# Patient Record
Sex: MALE | Employment: UNEMPLOYED | URBAN - METROPOLITAN AREA
[De-identification: names, ages, dates, MRNs, and addresses within clinical notes are randomized per-mention and may not be internally consistent; named-entity substitution may affect disease eponyms.]

---

## 2017-03-06 ENCOUNTER — HOSPITAL ENCOUNTER (INPATIENT)
Age: 72
LOS: 4 days | Discharge: NURSING, REHAB OR LONG-TERM CARE | DRG: 065 | End: 2017-03-10
Attending: EMERGENCY MEDICINE | Admitting: INTERNAL MEDICINE
Payer: COMMERCIAL

## 2017-03-06 ENCOUNTER — APPOINTMENT (OUTPATIENT)
Dept: CT IMAGING | Age: 72
DRG: 065 | End: 2017-03-06
Attending: EMERGENCY MEDICINE
Payer: COMMERCIAL

## 2017-03-06 ENCOUNTER — APPOINTMENT (OUTPATIENT)
Dept: GENERAL RADIOLOGY | Age: 72
DRG: 065 | End: 2017-03-06
Attending: EMERGENCY MEDICINE
Payer: COMMERCIAL

## 2017-03-06 DIAGNOSIS — G45.9 TRANSIENT CEREBRAL ISCHEMIA, UNSPECIFIED TYPE: Primary | ICD-10-CM

## 2017-03-06 PROBLEM — I63.9 ACUTE CVA (CEREBROVASCULAR ACCIDENT) (HCC): Status: ACTIVE | Noted: 2017-03-06

## 2017-03-06 LAB
ALBUMIN SERPL BCP-MCNC: 3.6 G/DL (ref 3.4–5)
ALBUMIN/GLOB SERPL: 0.8 {RATIO} (ref 0.8–1.7)
ALP SERPL-CCNC: 97 U/L (ref 45–117)
ALT SERPL-CCNC: 13 U/L (ref 16–61)
ANION GAP BLD CALC-SCNC: 7 MMOL/L (ref 3–18)
AST SERPL W P-5'-P-CCNC: 11 U/L (ref 15–37)
BASOPHILS # BLD AUTO: 0.1 K/UL (ref 0–0.06)
BASOPHILS # BLD: 1 % (ref 0–2)
BILIRUB DIRECT SERPL-MCNC: 0.1 MG/DL (ref 0–0.2)
BILIRUB SERPL-MCNC: 0.6 MG/DL (ref 0.2–1)
BUN SERPL-MCNC: 13 MG/DL (ref 7–18)
BUN/CREAT SERPL: 10 (ref 12–20)
CALCIUM SERPL-MCNC: 8.9 MG/DL (ref 8.5–10.1)
CHLORIDE SERPL-SCNC: 102 MMOL/L (ref 100–108)
CK MB CFR SERPL CALC: 1.8 % (ref 0–4)
CK MB SERPL-MCNC: 1.2 NG/ML (ref 5–25)
CK SERPL-CCNC: 65 U/L (ref 39–308)
CO2 SERPL-SCNC: 28 MMOL/L (ref 21–32)
CREAT SERPL-MCNC: 1.27 MG/DL (ref 0.6–1.3)
DIFFERENTIAL METHOD BLD: ABNORMAL
EOSINOPHIL # BLD: 0.3 K/UL (ref 0–0.4)
EOSINOPHIL NFR BLD: 2 % (ref 0–5)
ERYTHROCYTE [DISTWIDTH] IN BLOOD BY AUTOMATED COUNT: 13.5 % (ref 11.6–14.5)
GLOBULIN SER CALC-MCNC: 4.8 G/DL (ref 2–4)
GLUCOSE SERPL-MCNC: 204 MG/DL (ref 74–99)
HCT VFR BLD AUTO: 49.8 % (ref 36–48)
HGB BLD-MCNC: 17.7 G/DL (ref 13–16)
INR PPP: 1.1 (ref 0.8–1.2)
LYMPHOCYTES # BLD AUTO: 18 % (ref 21–52)
LYMPHOCYTES # BLD: 2.9 K/UL (ref 0.9–3.6)
MCH RBC QN AUTO: 30.3 PG (ref 24–34)
MCHC RBC AUTO-ENTMCNC: 35.5 G/DL (ref 31–37)
MCV RBC AUTO: 85.3 FL (ref 74–97)
MONOCYTES # BLD: 1 K/UL (ref 0.05–1.2)
MONOCYTES NFR BLD AUTO: 6 % (ref 3–10)
NEUTS SEG # BLD: 12.3 K/UL (ref 1.8–8)
NEUTS SEG NFR BLD AUTO: 73 % (ref 40–73)
PLATELET # BLD AUTO: 257 K/UL (ref 135–420)
PMV BLD AUTO: 9.6 FL (ref 9.2–11.8)
POTASSIUM SERPL-SCNC: 3.8 MMOL/L (ref 3.5–5.5)
PROT SERPL-MCNC: 8.4 G/DL (ref 6.4–8.2)
PROTHROMBIN TIME: 13.5 SEC (ref 11.5–15.2)
RBC # BLD AUTO: 5.84 M/UL (ref 4.7–5.5)
SODIUM SERPL-SCNC: 137 MMOL/L (ref 136–145)
TROPONIN I SERPL-MCNC: <0.02 NG/ML (ref 0–0.04)
WBC # BLD AUTO: 16.6 K/UL (ref 4.6–13.2)

## 2017-03-06 PROCEDURE — 85025 COMPLETE CBC W/AUTO DIFF WBC: CPT | Performed by: EMERGENCY MEDICINE

## 2017-03-06 PROCEDURE — 85610 PROTHROMBIN TIME: CPT | Performed by: EMERGENCY MEDICINE

## 2017-03-06 PROCEDURE — 70450 CT HEAD/BRAIN W/O DYE: CPT

## 2017-03-06 PROCEDURE — 93005 ELECTROCARDIOGRAM TRACING: CPT

## 2017-03-06 PROCEDURE — 80048 BASIC METABOLIC PNL TOTAL CA: CPT | Performed by: EMERGENCY MEDICINE

## 2017-03-06 PROCEDURE — 80076 HEPATIC FUNCTION PANEL: CPT | Performed by: EMERGENCY MEDICINE

## 2017-03-06 PROCEDURE — 65660000000 HC RM CCU STEPDOWN

## 2017-03-06 PROCEDURE — 74011250637 HC RX REV CODE- 250/637: Performed by: EMERGENCY MEDICINE

## 2017-03-06 PROCEDURE — 71010 XR CHEST PORT: CPT

## 2017-03-06 PROCEDURE — 99285 EMERGENCY DEPT VISIT HI MDM: CPT

## 2017-03-06 PROCEDURE — 84484 ASSAY OF TROPONIN QUANT: CPT | Performed by: EMERGENCY MEDICINE

## 2017-03-06 RX ORDER — ASPIRIN 325 MG
325 TABLET ORAL
Status: COMPLETED | OUTPATIENT
Start: 2017-03-06 | End: 2017-03-06

## 2017-03-06 RX ADMIN — ASPIRIN 325 MG ORAL TABLET 325 MG: 325 PILL ORAL at 21:10

## 2017-03-06 NOTE — IP AVS SNAPSHOT
Jose David Barragann 
 
 
 [address] Patient: Ramiro Lundberg MRN: [id number] You are allergic to the following No active allergies Recent Documentation Height Weight BMI  
  
  
 1.753 m 88.3 kg 29.59 kg/m2 Emergency Contacts Name Discharge Info Relation Home Work Mobile Fiorella Wilkins DISCHARGE CAREGIVER [3] Spouse [3] [phone] About your hospitalization You were admitted on:  March 6, 2017 You last received care in the:  [facility] [address] You were discharged on:  March 10, 2017 Unit phone number:  [phone] Why you were hospitalized Your primary diagnosis was:  Not on File Your diagnoses also included:  Tia (Transient Ischemic Attack), Acute Cva (Cerebrovascular Accident) (Hcc), Cva (Cerebral Vascular Accident) (Hcc), Hyperglycemia Providers Seen During Your Hospitalizations Provider Role Specialty Primary office phone Zhane Chavez MD Attending Provider Emergency Medicine [phone] Hamida Bernardo MD Attending Provider Internal Medicine Number not on file Your Primary Care Physician (PCP) Primary Care Physician Office Phone Office Fax Roberto Mcduffie [phone] Follow-up Information Follow up With Details Comments Contact Info Thor Last MD   [address] [address] 
[phone] Josie Markham MD Schedule an appointment as soon as possible for a visit in 1 month  1 [facility] [address] [place] [address] 
[phone] Patient is transferred to [facility] for further [email or web address] Current Discharge Medication List  
  
START taking these medications Dose & Instructions Dispensing Information Comments Morning Noon Evening Bedtime aspirin 81 mg chewable tablet Your next dose is: Today, Tomorrow Other:  _________ Dose:  81 mg Take 1 Tab by mouth daily. Quantity:  30 Tab Refills:  0  
     
   
   
   
  
 atorvastatin 80 mg tablet Commonly known as:  LIPITOR Your next dose is: Today, Tomorrow Other:  _________ Dose:  80 mg Take 1 Tab by mouth nightly. Quantity:  30 Tab Refills:  0  
     
   
   
   
  
 famotidine 20 mg tablet Commonly known as:  PEPCID Your next dose is: Today, Tomorrow Other:  _________ Dose:  20 mg Take 1 Tab by mouth nightly. Quantity:  30 Tab Refills:  0 HYDROcodone-acetaminophen 5-325 mg per tablet Commonly known as:  Nick Songster Your next dose is: Today, Tomorrow Other:  _________ Dose:  1 Tab Take 1 Tab by mouth every six (6) hours as needed. Max Daily Amount: 4 Tabs. Quantity:  15 Tab Refills:  0  
     
   
   
   
  
 insulin glargine 100 unit/mL injection Commonly known as:  LANTUS Your next dose is: Today, Tomorrow Other:  _________ Dose:  12 Units 12 Units by SubCUTAneous route daily. Quantity:  1 Vial  
Refills:  0  
     
   
   
   
  
 insulin lispro 100 unit/mL injection Commonly known as:  HUMALOG Your next dose is: Today, Tomorrow Other:  _________ SubCUTAneous route Before breakfast, lunch, dinner and at bedtime. For Blood Sugar (mg/dL) of:    Less than 150 =   0 units          150 -199 =   2 units 200 -249 =   4 units 250 -299 =   6 units 300 -349 =   8 units 350 and above =  10 units Quantity:  1 Vial  
Refills:  0  
     
   
   
   
  
 metoprolol tartrate 25 mg tablet Commonly known as:  LOPRESSOR Your next dose is: Today, Tomorrow Other:  _________ Dose:  25 mg Take 1 Tab by mouth two (2) times a day. Quantity:  60 Tab Refills:  0 Where to Get Your Medications Information on where to get these meds will be given to you by the nurse or doctor. ! Ask your nurse or doctor about these medications  
  aspirin 81 mg chewable tablet  
 atorvastatin 80 mg tablet  
 famotidine 20 mg tablet HYDROcodone-acetaminophen 5-325 mg per tablet  
 insulin glargine 100 unit/mL injection  
 insulin lispro 100 unit/mL injection  
 metoprolol tartrate 25 mg tablet Discharge Instructions Patient armband removed and shredded MyChart Activation Thank you for requesting access to [employer]. Please follow the instructions below to securely access and download your online medical record. [employer] allows you to send messages to your doctor, view your test results, renew your prescriptions, schedule appointments, and more. How Do I Sign Up? 1. In your internet browser, go to [email or web address] 
2. Click on the First Time User? Click Here link in the Sign In box. You will be redirect to the New Member Sign Up page. 3. Enter your [employer] Access Code exactly as it appears below. You will not need to use this code after youve completed the sign-up process. If you do not sign up before the expiration date, you must request a new code. [employer] Access Code: [email or web address] Expires: 2017  8:58 PM (This is the date your [employer] access code will ) 4. Enter the last four digits of your Social Security Number (xxxx) and Date of Birth (mm/dd/yyyy) as indicated and click Submit. You will be taken to the next sign-up page. 5. Create a [employer] ID. This will be your [employer] login ID and cannot be changed, so think of one that is secure and easy to remember. 6. Create a [employer] password. You can change your password at any time. 7. Enter your Password Reset Question and Answer. This can be used at a later time if you forget your password. 8. Enter your e-mail address.  You will receive e-mail notification when new information is available in Mitrohart. 9. Click Sign Up. You can now view and download portions of your medical record. 10. Click the Download Summary menu link to download a portable copy of your medical information. Additional Information If you have questions, please visit the Frequently Asked Questions section of the [employer] website at [email or web address] Remember, [employer] is NOT to be used for urgent needs. For medical emergencies, dial 911. DISCHARGE SUMMARY from Nurse The following personal items are in your possession at time of discharge: 
 
Dental Appliances: None Visual Aid: None Home Medications: None Jewelry: None Clothing: At bedside Other Valuables: None PATIENT INSTRUCTIONS: 
 
After general anesthesia or intravenous sedation, for 24 hours or while taking prescription Narcotics: · Limit your activities · Do not drive and operate hazardous machinery · Do not make important personal or business decisions · Do  not drink alcoholic beverages · If you have not urinated within 8 hours after discharge, please contact your surgeon on call. Report the following to your surgeon: 
· Excessive pain, swelling, redness or odor of or around the surgical area · Temperature over 100.5 · Nausea and vomiting lasting longer than 4 hours or if unable to take medications · Any signs of decreased circulation or nerve impairment to extremity: change in color, persistent  numbness, tingling, coldness or increase pain · Any questions What to do at Home: 
Recommended activity: Activity as tolerated, If you experience any of the following symptoms shortness of breath, chest pain, signs of stroke see fast, please follow up with ED or Primary Md. *  Please give a list of your current medications to your Primary Care Provider.  
 
*  Please update this list whenever your medications are discontinued, doses are 
 changed, or new medications (including over-the-counter products) are added. *  Please carry medication information at all times in case of emergency situations. These are general instructions for a healthy lifestyle: No smoking/ No tobacco products/ Avoid exposure to second hand smoke Surgeon General's Warning:  Quitting smoking now greatly reduces serious risk to your health. Obesity, smoking, and sedentary lifestyle greatly increases your risk for illness A healthy diet, regular physical exercise & weight monitoring are important for maintaining a healthy lifestyle You may be retaining fluid if you have a history of heart failure or if you experience any of the following symptoms:  Weight gain of 3 pounds or more overnight or 5 pounds in a week, increased swelling in our hands or feet or shortness of breath while lying flat in bed. Please call your doctor as soon as you notice any of these symptoms; do not wait until your next office visit. Recognize signs and symptoms of STROKE: 
 
F-face looks uneven A-arms unable to move or move unevenly S-speech slurred or non-existent T-time-call 911 as soon as signs and symptoms begin-DO NOT go Back to bed or wait to see if you get better-TIME IS BRAIN. Warning Signs of HEART ATTACK Call 911 if you have these symptoms: 
? Chest discomfort. Most heart attacks involve discomfort in the center of the chest that lasts more than a few minutes, or that goes away and comes back. It can feel like uncomfortable pressure, squeezing, fullness, or pain. ? Discomfort in other areas of the upper body. Symptoms can include pain or discomfort in one or both arms, the back, neck, jaw, or stomach. ? Shortness of breath with or without chest discomfort. ? Other signs may include breaking out in a cold sweat, nausea, or lightheadedness. Don't wait more than five minutes to call [address]!  Fast action can save your life. Calling 911 is almost always the fastest way to get lifesaving treatment. Emergency Medical Services staff can begin treatment when they arrive  up to an hour sooner than if someone gets to the hospital by car. The discharge information has been reviewed with the guardian. The guardian verbalized understanding. Discharge medications reviewed with the guardian and appropriate educational materials and side effects teaching were provided. Discharge Instructions Attachments/References STROKE (ENGLISH) STROKE REHABILITATION (ENGLISH) STROKE: EMOTIONS: GENERAL INFO (ENGLISH) STROKE: PRIMARY PREVENTION: GENERAL INFO (ENGLISH) STROKE: SECONDARY PREVENTION: GENERAL INFO (ENGLISH) STROKE: SYMPTOMS: GENERAL INFO (ENGLISH) TIA (TRANSIENT ISCHEMIC ATTACK) (ENGLISH) Discharge Orders None [facility] Announcement We are excited to announce that we are making your provider's discharge notes available to you in Digitel. You will see these notes when they are completed and signed by the physician that discharged you from your recent hospital stay. If you have any questions or concerns about any information you see in Digitel, please call the Health Information Department where you were seen or reach out to your Primary Care Provider for more information about your plan of care. Introducing [facility]! Hailey Brody introduces Digitel patient portal. Now you can access parts of your medical record, email your doctor's office, and request medication refills online. 1. In your internet browser, go to [email or web address] 2. Click on the First Time User? Click Here link in the Sign In box. You will see the New Member Sign Up page. 3. Enter your Digitel Access Code exactly as it appears below. You will not need to use this code after youve completed the sign-up process.  If you do not sign up before the expiration date, you must request a new code. · SingleFeed Access Code: [email or web address] Expires: 6/4/2017  8:58 PM 
 
4. Enter the last four digits of your Social Security Number (xxxx) and Date of Birth (mm/dd/yyyy) as indicated and click Submit. You will be taken to the next sign-up page. 5. Create a SingleFeed ID. This will be your SingleFeed login ID and cannot be changed, so think of one that is secure and easy to remember. 6. Create a SingleFeed password. You can change your password at any time. 7. Enter your Password Reset Question and Answer. This can be used at a later time if you forget your password. 8. Enter your e-mail address. You will receive e-mail notification when new information is available in [address] 9. Click Sign Up. You can now view and download portions of your medical record. 10. Click the Download Summary menu link to download a portable copy of your medical information. If you have questions, please visit the Frequently Asked Questions section of the SingleFeed website. Remember, SingleFeed is NOT to be used for urgent needs. For medical emergencies, dial 911. Now available from your iPhone and Android! General Information Please provide this summary of care documentation to your next provider. Patient Signature:  ____________________________________________________________ Date:  ____________________________________________________________  
  
Earnstine Ted Provider Signature:  ____________________________________________________________ Date:  ____________________________________________________________ More Information Stroke: Care Instructions Your Care Instructions You have had a stroke. This means that the blood flow to a part of your brain was blocked for some time, which damages the nerve cells in that part of the brain.  The part of your body controlled by that part of your brain may not function properly now. The brain is an amazing organ that can heal itself to some degree. The stroke you had damaged part of your brain. But other parts of your brain may take over in some way for the damaged areas. You have already started this process. Your doctor will talk with you about what you can do to prevent another stroke. High blood pressure, high cholesterol, and diabetes are all risk factors for stroke. If you have any of these conditions, work with your doctor to make sure they are under control. Other risk factors for stroke include being overweight, smoking, and not getting regular exercise. Going home may be hard for you and your family. The more you can try to do for yourself, the better. Remember to take each day one at a time. Follow-up care is a key part of your treatment and safety. Be sure to make and go to all appointments, and call your doctor if you are having problems. It's also a good idea to know your test results and keep a list of the medicines you take. How can you care for yourself at home? · Enter a stroke rehabilitation (rehab) program, if your doctor recommends it. Physical, speech, and occupational therapies can help you manage bathing, dressing, eating, and other basics of daily living. · Do not drive until your doctor says it is okay. · It is normal to feel sad or depressed after a stroke. If these feelings last, talk to your doctor. · If you are having problems with urine leakage, go to the bathroom at regular times, including when you first wake up and at bedtime. Also, limit fluids after dinner. · If you are constipated, drink plenty of fluids, enough so that your urine is light yellow or clear like water. If you have kidney, heart, or liver disease and have to limit fluids, talk with your doctor before you increase the amount of fluids you drink. Set up a regular time for using the toilet.  If you continue to have constipation, your doctor may suggest using a bulking agent, such as Metamucil, or a stool softener, laxative, or enema. Medicines · Take your medicines exactly as prescribed. Call your doctor if you think you are having a problem with your medicine. You may be taking several medicines. ACE (angiotensin-converting enzyme) inhibitors, angiotensin II receptor blockers (ARBs), beta-blockers, diuretics (water pills), and calcium channel blockers control your blood pressure. Statins help lower cholesterol. Your doctor may also prescribe medicines for depression, pain, sleep problems, anxiety, or agitation. · If your doctor has given you a blood thinner to prevent another stroke, be sure you get instructions about how to take your medicine safely. Blood thinners can cause serious bleeding problems. · Do not take any over-the-counter medicines or herbal products without talking to your doctor first. 
· If you take birth control pills or hormone therapy, talk to your doctor about whether they are right for you. For family members and caregivers · Make the home safe. Set up a room so that your loved one does not have to climb stairs. Be sure the bathroom is on the same floor. Move throw rugs and furniture that could cause falls. Make sure that the lighting is good. Put grab bars and seats in tubs and showers. · Find out what your loved one can do and what he or she needs help with. Try not to do things for your loved one that your loved one can do on his or her own. Help him or her learn and practice new skills. · Visit and talk with your loved one often. Try doing activities together that you both enjoy, such as playing cards or board games. Keep in touch with your loved one's friends as much as you can. Encourage them to visit. · Take care of yourself. Do not try to do everything yourself. Ask other family members to help. Eat well, get enough rest, and take time to do things that you enjoy.  Keep up with your own doctor visits, and make sure to take your medicines regularly. Get out of the house as much as you can. Join a local support group. Find out if you qualify for home health care visits to help with rehab or for adult day care. When should you call for help? Call 911 anytime you think you may need emergency care. For example, call if: 
· You have signs of another stroke. These may include: 
¨ Sudden numbness, tingling, weakness, or loss of movement in your face, arm, or leg, especially on only one side of your body. ¨ Sudden vision changes. ¨ Sudden trouble speaking. ¨ Sudden confusion or trouble understanding simple statements. ¨ Sudden problems with walking or balance. ¨ A sudden, severe headache that is different from past headaches. Call 911 even if these symptoms go away in a few minutes. Call your doctor now or seek immediate medical care if: 
· You have new symptoms that may be related to your stroke, such as falls or trouble swallowing. Watch closely for changes in your health, and be sure to contact your doctor if you have any problems. Where can you learn more? Go to http://jorge-david.info/. Enter V980 in the search box to learn more about \"Stroke: Care Instructions. \" Current as of: June 4, 2016 Content Version: 11.1 © 4784-1678 Healthwise, Incorporated. Care instructions adapted under license by [employer] (which disclaims liability or warranty for this information). If you have questions about a medical condition or this instruction, always ask your healthcare professional. [place] [address] any warranty or liability for your use of this information. Stroke Rehabilitation: Care Instructions Your Care Instructions Stroke rehabilitation (rehab) is training and therapy to help you learn to do everyday things you can no longer do since your stroke. The focus will depend on how the stroke has affected your ability to do the things you want and need to do. Rehab begins in the hospital. It starts as soon as possible after the stroke. You will have a team of doctors, nurses, and therapists to help you relearn daily activities, such as eating, bathing, and dressing. You also may need help to learn how to walk or talk again. If the stroke damaged your memory, you will learn ways to improve it. You will recover faster if you begin rehab soon after the stroke and do a little every day. Most rehab programs offer at least 3 hours of therapy a day, 5 or 6 days a week. But even with rehab, you may not be able to do all the things you could before the stroke. You will need medicines to prevent another stroke. Your doctor may prescribe other medicines to lower high blood pressure and cholesterol. You may also be given medicines to help manage pain, sleep problems, or depression. Follow-up care is a key part of your treatment and safety. Be sure to make and go to all appointments, and call your doctor if you are having problems. It's also a good idea to know your test results and keep a list of the medicines you take. How can you care for yourself at home? · Follow any instructions from your doctor, therapist, or rehab care team about how to return safely to everyday tasks. · If you have problems swallowing after a stroke, follow your care team's instructions on how to swallow and prevent choking. This may include what kinds of foods to eat or avoid. Your instructions may include tips such as these: 
¨ Eat foods that smell good. This can increase the amount of saliva and help you swallow. ¨ Take small bites of food. Place food on the unaffected side of your mouth. ¨ Allow about 30 to 40 minutes to eat, so you will not feel rushed. Also, sit up for 45 to 60 minutes after you finish eating. ¨ Fill your glass three-quarters full so that you do not have to tilt your head back very far.  
· Have someone clean up any clutter in your house so you do not trip over it. Have area rugs removed. Make sure your rooms are well lit. · Put Velcro, elastic, and snaps on clothes to make them easier to get on and off. It may also help to sit down while you get dressed. · Put safety features, such as rails and nonskid tape, in the bathroom and other rooms. · Use a cane or walker to help you get around. · Do not sit or lie down in the same position for a long time. Check your skin every day for pressure sores. · Wear disposable pads if you have problems with bladder control. When should you call for help? Call 911 anytime you think you may need emergency care. For example, call if: 
· You have symptoms of a stroke. These may include: 
¨ Sudden numbness, tingling, weakness, or loss of movement in your face, arm, or leg, especially on only one side of your body. ¨ Sudden vision changes. ¨ Sudden trouble speaking. ¨ Sudden confusion or trouble understanding simple statements. ¨ Sudden problems with walking or balance. ¨ A sudden, severe headache that is different from past headaches. Call your doctor now or seek immediate medical care if: 
· You have problems that rehab is not helping. · You are depressed about your rehab progress. Watch closely for changes in your health, and be sure to contact your doctor if: 
· You do not get better as expected. Where can you learn more? Go to http://jorge-david.info/. Enter V173 in the search box to learn more about \"Stroke Rehabilitation: Care Instructions. \" Current as of: June 4, 2016 Content Version: 11.1 © 4683-0218 Healthwise, Incorporated. Care instructions adapted under license by "[employer] (which disclaims liability or warranty for this information). If you have questions about a medical condition or this instruction, always ask your healthcare professional. [address] any warranty or liability for your use of this information. Learning About Emotional Changes After a Stroke Introduction After a stroke, many people feel different without knowing why. For example, some people find it hard to control their emotions. They may cry or laugh for no reason. Or they may feel down or even hopeless. Some people may find they're acting differently. They may act too quickly or on impulse. Or they may be more anxious and hesitant at times. If these changes happen to you, they can be upsetting. And they can be confusing to you and your loved ones. But these changes may get better with time as your brain heals. Let your loved ones know what's happening. Their support and understanding can help you deal with these feelings. And with time and support from the people around you, you can learn ways to adjust to life after a stroke. Follow-up care is a key part of your treatment and safety. Be sure to make and go to all appointments, and call your doctor if you are having problems. It's also a good idea to know your test results and keep a list of the medicines you take. How can a stroke affect your emotions? After a stroke, some people feel like they have lost control of their emotions. These feelings can come from one or both of these two causes: · A stroke can affect parts of the brain that control how you feel. · A stroke can leave you with upsetting body changes that take away some of your independence. For example, some people may feel: · Sad or angry about the loss of the lifestyle they had before. · Isolated by speech and language problems. · Frustrated by the slow pace of recovery. · Worried about the future. These feelings are normal and expected. These strong feelings are more likely to happen if you need to stay in bed for long periods of time. And it is more likely to be a problem at night. It may help to have a radio playing softly in the bedroom or a dim light beside the bed. How can you deal with your emotions after a stroke? To deal with your emotions: 
· Be easy on yourself. Let go of mistakes. · Give yourself credit for the progress you have made. · Make time for things that you enjoy. · Join a stroke support group. Your rehab team or local hospital can help you find one. Is depression common? It is common to feel sad about changes caused by the stroke. Sometimes the injury to the brain from the stroke can cause depression. If you think you might be depressed, tell your doctor right away. The sooner you know if you are depressed, the sooner you can get treatment. Treatment can help you feel better. Your doctor will want to know if in the past 2 weeks: 
· You have lost interest or pleasure in doing things. · You have been feeling sad, hopeless, or empty. Your doctor may also ask about sleep troubles or changes in eating. How can friends and family help? Your loved ones can help you by following these tips: · A person who has had a stroke may tend to have strong emotional reactions. Remember that these are a result of the stroke. Try not to become too upset by them. · Don't avoid a loved one who's had a stroke. Contact with and support from family members is very important to recovery. · Watch for signs of depression in people who have had a stroke. Urge them to talk to their doctor if they think they might be depressed. Where can you learn more? Go to http://jorge-david.info/. Enter 921 9914 in the search box to learn more about \"Learning About Emotional Changes After a Stroke. \" Current as of: July 20, 2016 Content Version: 11.1 © 9888-6518 [employer]. Care instructions adapted under license by [employer] (which disclaims liability or warranty for this information).  If you have questions about a medical condition or this instruction, always ask your healthcare professional. [employer] disclaims any warranty or liability for your use of this information. Learning About How to Prevent a Stroke What is a stroke? A stroke occurs when a blood vessel in the brain bursts or is blocked by a blood clot. Without blood and the oxygen it carries, part of the brain starts to die. The part of the body controlled by the damaged area of the brain can't work properly. But there are many things you can do to help lower your stroke risk. What increases your risk for stroke? A risk factor is anything that makes you more likely to have a particular health problem. Risk factors for stroke that you can treat or change include: 
· Health problems like high blood pressure, atrial fibrillation, diabetes, and high cholesterol. · Smoking. · Heavy use of alcohol. · Being overweight. · Not getting enough physical activity. Risk factors you can't change include: · Age. The risk of stroke goes up as you get older. · Race.  Americans, Native Americans, and [place] Natives have a higher risk than those of other races. · Being female. Women have a higher risk of stroke than men. · Family history of stroke. Your doctor can help you know your risk. Then you and your can doctor talk about whether you need to lower it. What can you do to prevent a stroke? · Treat any health problems you have that raise your risk. · Adopt a heart-healthy lifestyle: ¨ Don't smoke. If you need help quitting, talk to your doctor about stop-smoking programs and medicines. These can increase your chances of quitting for good. ¨ Limit alcohol to 2 drinks a day for men and 1 drink a day for women. ¨ Stay at a healthy weight. Lose weight if you need to. ¨ If your doctor recommends it, get more exercise. Walking is a good choice. Bit by bit, increase the amount you walk every day. Try for at least 30 minutes on most days of the week. ¨ Eat heart-healthy foods.  These include fruits, vegetables, high-fiber foods, and fish. Choose foods that are low in sodium, saturated fat, and trans fat. · Decide with your doctor whether you will also take medicines to help lower your risk. For example, you and your doctor may decide you will take a medicine that prevents blood clots. What are the symptoms of a stroke? The brain damage from a stroke starts within minutes. That's why it's so important to know the symptoms of stroke and to act fast. Quick treatment can help limit damage to the brain so that you have fewer problems after the stroke. FAST is a simple way to remember the main symptoms of stroke. Recognizing these symptoms helps you know when to call for medical help. FAST stands for: 
· Face drooping. · Arm weakness. · Speech difficulty. · Time to call 911. Follow-up care is a key part of your treatment and safety. Be sure to make and go to all appointments, and call your doctor if you are having problems. It's also a good idea to know your test results and keep a list of the medicines you take. Where can you learn more? Go to http://jorge-david.info/. Enter G757 in the search box to learn more about \"Learning About How to Prevent a Stroke. \" Current as of: July 27, 2016 Content Version: 11.1 © 3553-8009 [employer]. Care instructions adapted under license by [employer] (which disclaims liability or warranty for this information). If you have questions about a medical condition or this instruction, always ask your healthcare professional. [place] [address] any warranty or liability for your use of this information. Learning About How to Prevent Another Stroke What can you do to prevent another stroke? After a stroke, people feel lots of different emotions. Some people are worried that they could have another stroke. Or they may feel overwhelmed by how much there is to learn and do. Some people feel sad or depressed. No matter what emotions you are feeling, you can give yourself some control and peace of mind by making a plan to lower your risk of having another stroke. Take your medicines You'll need to take medicines to help prevent another stroke. Be sure to take your medicines exactly as prescribed. And don't stop taking them unless your doctor tells you to. If you stop taking your medicines, you can increase your risk of having another stroke. Some of the medicines your doctor may prescribe include: · Aspirin or some other blood thinner to prevent blood clots. · Statins to lower cholesterol. · Blood pressure medicines to lower blood pressure. Manage other health problems You can help lower your chance of having another stroke by managing certain other health problems. Problems that increase your risk of having another stroke include: · High blood pressure. · High cholesterol. · Atrial fibrillation. · Diabetes. If you have any of these health problems, you can manage them with healthy lifestyle changes along with medicine. Adopt a healthy lifestyle · Do not smoke or allow others to smoke around you. If you need help quitting, talk to your doctor about stop-smoking programs and medicines. These can increase your chances of quitting for good. Smoking makes a stroke more likely. · Limit alcohol to 2 drinks a day for men and 1 drink a day for women. · Lose weight if you need to. Controlling your weight will help you keep your heart and body healthy. · Be active. Ask your doctor what type and level of activity is safe for you. · Eat heart-healthy foods, like fruits, vegetables, and high-fiber foods. It's also important to: · Get a flu shot every year. · Ask for help if you think you are depressed. Do stroke rehab Taking part in a stroke rehabilitation (rehab) program will help you to regain skills you lost or make the most of your abilities after a stroke. It also helps you take steps to prevent another stroke. Your rehab team will give you education and support to help you build new, healthy habits. You'll learn how to manage any other health problems that you might have. Brenda Antunez also learn how to exercise safely, eat a healthy diet, and quit smoking if you smoke. Brenda Johnsjesenia work with your team to decide what lifestyle choices are best for you. If your doctor hasn't already suggested it, ask him or her if stroke rehab is right for you. Know stroke symptoms Make sure you know the symptoms of stroke. FAST is a simple way to remember. Recognizing these symptoms helps you know when to call for medical help. FAST stands for: 
· Face drooping. · Arm weakness. · Speech difficulty. · Time to call 911. Follow-up care is a key part of your treatment and safety. Be sure to make and go to all appointments, and call your doctor if you are having problems. It's also a good idea to know your test results and keep a list of the medicines you take. Where can you learn more? Go to http://jorge-david.info/. Enter M409 in the search box to learn more about \"Learning About How to Prevent Another Stroke. \" Current as of: July 27, 2016 Content Version: 11.1 © 3687-2414 [employer]. Care instructions adapted under license by [employer] (which disclaims liability or warranty for this information). If you have questions about a medical condition or this instruction, always ask your healthcare professional. [place] [address] any warranty or liability for your use of this information. Learning About FAST: Stroke Warning Signs What is FAST? FAST is a simple way to remember the main symptoms of stroke. Recognizing these symptoms helps you know when to call for medical help. FAST stands for: 
· Face drooping. · Arm weakness. · Speech difficulty. · Time to call 911. What happens when you have a stroke? A stroke occurs when a blood vessel to the brain bursts or is blocked by a blood clot. Within minutes, the nerve cells in that part of the brain die. As a result, the part of the body controlled by those cells cannot work properly. The effects of a stroke may range from mild to severe. They may get better, or they may last the rest of your life. A stroke can affect vision, speech, behavior, thought processes, and your ability to move. It can cause symptoms that may include: 
· Sudden numbness, tingling, weakness, or loss of movement in your face, arm, or leg, especially on only one side of your body. · Sudden vision changes. · Sudden trouble speaking. · Sudden confusion or trouble understanding simple statements. · Sudden problems with walking or balance. · A sudden, severe headache that is different from past headaches. Why is it important to get help FAST? Quick treatment may save your life. And it may reduce the damage in your brain so that you have fewer problems after the stroke. When you know the FAST symptoms, you will know when it's important to call for medical help. Where can you learn more? Go to http://jorge-david.info/. Enter W865 in the search box to learn more about \"Learning About FAST: Stroke Warning Signs. \" Current as of: June 4, 2016 Content Version: 11.1 © 3201-8496 [employer] [facility]. Care instructions adapted under license by [employer] (which disclaims liability or warranty for this information). If you have questions about a medical condition or this instruction, always ask your healthcare professional. [place] [address] any warranty or liability for your use of this information. Transient Ischemic Attack: Care Instructions Your Care Instructions A transient ischemic attack (TIA) is when blood flow to a part of your brain is blocked for a short time.  A TIA is like a stroke but usually lasts only a few minutes. A TIA does not cause lasting brain damage. Any vision problems, slurred speech, or other symptoms usually go away in 10 to 20 minutes. But they may last for up to 24 hours. TIAs are often warning signs of a stroke. Some people who have a TIA may have a stroke in the future. A stroke can cause symptoms like those of a TIA. But a stroke causes lasting damage to your brain. You can take steps to help prevent a stroke. One thing you can do is get early treatment. If you have other new symptoms, or if your symptoms do not get better, go back to the emergency room or call your doctor right away. Getting treatment right away may prevent long-term brain damage caused by a stroke. The doctor has checked you carefully, but problems can develop later. If you notice any problems or new symptoms, get medical treatment right away. Follow-up care is a key part of your treatment and safety. Be sure to make and go to all appointments, and call your doctor if you are having problems. It's also a good idea to know your test results and keep a list of the medicines you take. How can you care for yourself at home? Medicines · Be safe with medicines. Take your medicines exactly as prescribed. Call your doctor if you think you are having a problem with your medicine. · If you take a blood thinner, such as aspirin, be sure you get instructions about how to take your medicine safely. Blood thinners can cause serious bleeding problems. · Call your doctor if you are not able to take your medicines for any reason. · Do not take any over-the-counter medicines or herbal products without talking to your doctor first. 
· If you take birth control pills or hormone therapy, talk to your doctor. Ask if these treatments are right for you. Lifestyle changes · Do not smoke. If you need help quitting, talk to your doctor about stop-smoking programs and medicines. · Be active. If your doctor recommends it, get more exercise. Walking is a good choice. Bit by bit, increase the amount you walk every day. Try for at least 30 minutes on most days of the week. You also may want to swim, bike, or do other activities. · Eat heart-healthy foods. These include fruits, vegetables, high-fiber foods, fish, and foods that are low in sodium, saturated fat, and trans fat. · Stay at a healthy weight. Lose weight if you need to. · Limit alcohol to 2 drinks a day for men and 1 drink a day for women. Staying healthy · Manage other health problems such as diabetes, high blood pressure, and high cholesterol. · Get the flu vaccine every year. When should you call for help? Call 911 anytime you think you may need emergency care. For example, call if: 
· You have new or worse symptoms of a stroke. These may include: 
¨ Sudden numbness, tingling, weakness, or loss of movement in your face, arm, or leg, especially on only one side of your body. ¨ Sudden vision changes. ¨ Sudden trouble speaking. ¨ Sudden confusion or trouble understanding simple statements. ¨ Sudden problems with walking or balance. ¨ A sudden, severe headache that is different from past headaches. Call 911 even if these symptoms go away in a few minutes. · You feel like you are having another TIA. Watch closely for changes in your health, and be sure to contact your doctor if you have any problems. Where can you learn more? Go to http://jorge-david.info/. Enter [phone] [id number] in the search box to learn more about \"Transient Ischemic Attack: Care Instructions. \" Current as of: June 4, 2016 Content Version: 11.1 © 7724-1141 [employer]. Care instructions adapted under license by [employer] (which disclaims liability or warranty for this information).  If you have questions about a medical condition or this instruction, always ask your healthcare professional. Horatio Habermann[employer] disclaims any warranty or liability for your use of this information.

## 2017-03-06 NOTE — IP AVS SNAPSHOT
Current Discharge Medication List  
  
Take these medications at their scheduled times Dose & Instructions Dispensing Information Comments Morning Noon Evening Bedtime  
 aspirin 81 mg chewable tablet Your next dose is: Today, Tomorrow Other:  ____________ Dose:  81 mg Take 1 Tab by mouth daily. Quantity:  30 Tab Refills:  0  
     
   
   
   
  
 atorvastatin 80 mg tablet Commonly known as:  LIPITOR Your next dose is: Today, Tomorrow Other:  ____________ Dose:  80 mg Take 1 Tab by mouth nightly. Quantity:  30 Tab Refills:  0  
     
   
   
   
  
 famotidine 20 mg tablet Commonly known as:  PEPCID Your next dose is: Today, Tomorrow Other:  ____________ Dose:  20 mg Take 1 Tab by mouth nightly. Quantity:  30 Tab Refills:  0  
     
   
   
   
  
 insulin glargine 100 unit/mL injection Commonly known as:  LANTUS Your next dose is: Today, Tomorrow Other:  ____________ Dose:  12 Units 12 Units by SubCUTAneous route daily. Quantity:  1 Vial  
Refills:  0  
     
   
   
   
  
 metoprolol tartrate 25 mg tablet Commonly known as:  LOPRESSOR Your next dose is: Today, Tomorrow Other:  ____________ Dose:  25 mg Take 1 Tab by mouth two (2) times a day. Quantity:  60 Tab Refills:  0 Take these medications as needed Dose & Instructions Dispensing Information Comments Morning Noon Evening Bedtime HYDROcodone-acetaminophen 5-325 mg per tablet Commonly known as:  Tori Arts Your next dose is: Today, Tomorrow Other:  ____________ Dose:  1 Tab Take 1 Tab by mouth every six (6) hours as needed. Max Daily Amount: 4 Tabs. Quantity:  15 Tab Refills:  0 Take these medications as directed Dose & Instructions Dispensing Information Comments Morning Noon Evening Bedtime  
 insulin lispro 100 unit/mL injection Commonly known as:  HUMALOG Your next dose is: Today, Tomorrow Other:  ____________ SubCUTAneous route Before breakfast, lunch, dinner and at bedtime. For Blood Sugar (mg/dL) of:    Less than 150 =   0 units          150 -199 =   2 units 200 -249 =   4 units 250 -299 =   6 units 300 -349 =   8 units 350 and above =  10 units Quantity:  1 Vial  
Refills:  0 Where to Get Your Medications Information about where to get these medications is not yet available ! Ask your nurse or doctor about these medications  
  aspirin 81 mg chewable tablet  
 atorvastatin 80 mg tablet  
 famotidine 20 mg tablet HYDROcodone-acetaminophen 5-325 mg per tablet  
 insulin glargine 100 unit/mL injection  
 insulin lispro 100 unit/mL injection  
 metoprolol tartrate 25 mg tablet

## 2017-03-06 NOTE — Clinical Note
Status[de-identified] Inpatient [101] Type of Bed: Neuro/Stroke [9] Inpatient Hospitalization Certified Necessary for the Following Reasons: 8. Other (further clarification in H&P documentation) Admitting Diagnosis: TIA (transient ischemic attack) [097100] Admitting Physician: Chester Lee Attending Physician: Chester Lee Estimated Length of Stay: > or = to 2 Midnights Discharge Plan[de-identified] 2003 [facility]

## 2017-03-07 ENCOUNTER — APPOINTMENT (OUTPATIENT)
Dept: MRI IMAGING | Age: 72
DRG: 065 | End: 2017-03-07
Attending: INTERNAL MEDICINE
Payer: COMMERCIAL

## 2017-03-07 ENCOUNTER — APPOINTMENT (OUTPATIENT)
Dept: GENERAL RADIOLOGY | Age: 72
DRG: 065 | End: 2017-03-07
Attending: FAMILY MEDICINE
Payer: COMMERCIAL

## 2017-03-07 PROBLEM — R73.9 HYPERGLYCEMIA: Status: ACTIVE | Noted: 2017-03-07

## 2017-03-07 PROBLEM — I63.9 CVA (CEREBRAL VASCULAR ACCIDENT) (HCC): Status: ACTIVE | Noted: 2017-03-07

## 2017-03-07 LAB
ATRIAL RATE: 95 BPM
CALCULATED P AXIS, ECG09: -11 DEGREES
CALCULATED R AXIS, ECG10: 34 DEGREES
CALCULATED T AXIS, ECG11: 63 DEGREES
DIAGNOSIS, 93000: NORMAL
EST. AVERAGE GLUCOSE BLD GHB EST-MCNC: 226 MG/DL
GLUCOSE BLD STRIP.AUTO-MCNC: 182 MG/DL (ref 70–110)
GLUCOSE BLD STRIP.AUTO-MCNC: 220 MG/DL (ref 70–110)
GLUCOSE BLD STRIP.AUTO-MCNC: 236 MG/DL (ref 70–110)
GLUCOSE BLD STRIP.AUTO-MCNC: 241 MG/DL (ref 70–110)
HBA1C MFR BLD: 9.5 % (ref 4.2–5.6)
P-R INTERVAL, ECG05: 136 MS
Q-T INTERVAL, ECG07: 350 MS
QRS DURATION, ECG06: 78 MS
QTC CALCULATION (BEZET), ECG08: 439 MS
VENTRICULAR RATE, ECG03: 95 BPM

## 2017-03-07 PROCEDURE — 97165 OT EVAL LOW COMPLEX 30 MIN: CPT

## 2017-03-07 PROCEDURE — 70551 MRI BRAIN STEM W/O DYE: CPT

## 2017-03-07 PROCEDURE — 65660000000 HC RM CCU STEPDOWN

## 2017-03-07 PROCEDURE — 97161 PT EVAL LOW COMPLEX 20 MIN: CPT

## 2017-03-07 PROCEDURE — 36415 COLL VENOUS BLD VENIPUNCTURE: CPT | Performed by: INTERNAL MEDICINE

## 2017-03-07 PROCEDURE — 74011250636 HC RX REV CODE- 250/636: Performed by: INTERNAL MEDICINE

## 2017-03-07 PROCEDURE — 73560 X-RAY EXAM OF KNEE 1 OR 2: CPT

## 2017-03-07 PROCEDURE — 92610 EVALUATE SWALLOWING FUNCTION: CPT

## 2017-03-07 PROCEDURE — 83036 HEMOGLOBIN GLYCOSYLATED A1C: CPT | Performed by: INTERNAL MEDICINE

## 2017-03-07 PROCEDURE — 97530 THERAPEUTIC ACTIVITIES: CPT

## 2017-03-07 PROCEDURE — 82962 GLUCOSE BLOOD TEST: CPT

## 2017-03-07 PROCEDURE — 74011636637 HC RX REV CODE- 636/637: Performed by: INTERNAL MEDICINE

## 2017-03-07 PROCEDURE — 74011250637 HC RX REV CODE- 250/637: Performed by: INTERNAL MEDICINE

## 2017-03-07 RX ORDER — ACETAMINOPHEN 325 MG/1
650 TABLET ORAL
Status: DISCONTINUED | OUTPATIENT
Start: 2017-03-07 | End: 2017-03-10 | Stop reason: HOSPADM

## 2017-03-07 RX ORDER — ATORVASTATIN CALCIUM 40 MG/1
40 TABLET, FILM COATED ORAL
Status: DISCONTINUED | OUTPATIENT
Start: 2017-03-07 | End: 2017-03-10

## 2017-03-07 RX ORDER — SODIUM CHLORIDE 0.9 % (FLUSH) 0.9 %
5-10 SYRINGE (ML) INJECTION EVERY 8 HOURS
Status: DISCONTINUED | OUTPATIENT
Start: 2017-03-07 | End: 2017-03-10 | Stop reason: HOSPADM

## 2017-03-07 RX ORDER — HALOPERIDOL 5 MG/ML
INJECTION INTRAMUSCULAR
Status: DISPENSED
Start: 2017-03-07 | End: 2017-03-07

## 2017-03-07 RX ORDER — ASPIRIN 325 MG
325 TABLET ORAL DAILY
Status: DISCONTINUED | OUTPATIENT
Start: 2017-03-07 | End: 2017-03-07

## 2017-03-07 RX ORDER — ASPIRIN 325 MG
325 TABLET ORAL DAILY
Status: DISCONTINUED | OUTPATIENT
Start: 2017-03-08 | End: 2017-03-08

## 2017-03-07 RX ORDER — HALOPERIDOL 5 MG/ML
5 INJECTION INTRAMUSCULAR
Status: DISCONTINUED | OUTPATIENT
Start: 2017-03-07 | End: 2017-03-10 | Stop reason: HOSPADM

## 2017-03-07 RX ORDER — FAMOTIDINE 20 MG/1
20 TABLET, FILM COATED ORAL EVERY 12 HOURS
Status: DISCONTINUED | OUTPATIENT
Start: 2017-03-07 | End: 2017-03-10 | Stop reason: HOSPADM

## 2017-03-07 RX ORDER — SODIUM CHLORIDE 9 MG/ML
100 INJECTION, SOLUTION INTRAVENOUS CONTINUOUS
Status: DISPENSED | OUTPATIENT
Start: 2017-03-07 | End: 2017-03-08

## 2017-03-07 RX ORDER — LABETALOL HYDROCHLORIDE 5 MG/ML
5 INJECTION, SOLUTION INTRAVENOUS
Status: DISCONTINUED | OUTPATIENT
Start: 2017-03-07 | End: 2017-03-10 | Stop reason: HOSPADM

## 2017-03-07 RX ORDER — LABETALOL HCL 20 MG/4 ML
5 SYRINGE (ML) INTRAVENOUS
Status: DISCONTINUED | OUTPATIENT
Start: 2017-03-07 | End: 2017-03-10 | Stop reason: HOSPADM

## 2017-03-07 RX ORDER — SODIUM CHLORIDE 0.9 % (FLUSH) 0.9 %
5-10 SYRINGE (ML) INJECTION AS NEEDED
Status: DISCONTINUED | OUTPATIENT
Start: 2017-03-07 | End: 2017-03-10 | Stop reason: HOSPADM

## 2017-03-07 RX ORDER — HEPARIN SODIUM 5000 [USP'U]/ML
5000 INJECTION, SOLUTION INTRAVENOUS; SUBCUTANEOUS EVERY 8 HOURS
Status: DISCONTINUED | OUTPATIENT
Start: 2017-03-07 | End: 2017-03-10 | Stop reason: HOSPADM

## 2017-03-07 RX ORDER — MAGNESIUM SULFATE 100 %
4 CRYSTALS MISCELLANEOUS AS NEEDED
Status: DISCONTINUED | OUTPATIENT
Start: 2017-03-07 | End: 2017-03-10 | Stop reason: HOSPADM

## 2017-03-07 RX ORDER — INSULIN LISPRO 100 [IU]/ML
INJECTION, SOLUTION INTRAVENOUS; SUBCUTANEOUS
Status: DISCONTINUED | OUTPATIENT
Start: 2017-03-07 | End: 2017-03-10 | Stop reason: HOSPADM

## 2017-03-07 RX ORDER — DEXTROSE 50 % IN WATER (D50W) INTRAVENOUS SYRINGE
25-50 AS NEEDED
Status: DISCONTINUED | OUTPATIENT
Start: 2017-03-07 | End: 2017-03-10 | Stop reason: HOSPADM

## 2017-03-07 RX ADMIN — HEPARIN SODIUM 5000 UNITS: 5000 INJECTION, SOLUTION INTRAVENOUS; SUBCUTANEOUS at 09:34

## 2017-03-07 RX ADMIN — HEPARIN SODIUM 5000 UNITS: 5000 INJECTION, SOLUTION INTRAVENOUS; SUBCUTANEOUS at 18:23

## 2017-03-07 RX ADMIN — FAMOTIDINE 20 MG: 20 TABLET ORAL at 21:18

## 2017-03-07 RX ADMIN — HEPARIN SODIUM 5000 UNITS: 5000 INJECTION, SOLUTION INTRAVENOUS; SUBCUTANEOUS at 01:18

## 2017-03-07 RX ADMIN — ACETAMINOPHEN 650 MG: 325 TABLET ORAL at 14:43

## 2017-03-07 RX ADMIN — Medication 10 ML: at 21:19

## 2017-03-07 RX ADMIN — INSULIN LISPRO 4 UNITS: 100 INJECTION, SOLUTION INTRAVENOUS; SUBCUTANEOUS at 18:26

## 2017-03-07 RX ADMIN — ATORVASTATIN CALCIUM 40 MG: 40 TABLET, FILM COATED ORAL at 21:18

## 2017-03-07 RX ADMIN — FAMOTIDINE 20 MG: 20 TABLET ORAL at 01:17

## 2017-03-07 RX ADMIN — ATORVASTATIN CALCIUM 40 MG: 40 TABLET, FILM COATED ORAL at 01:17

## 2017-03-07 RX ADMIN — SODIUM CHLORIDE 100 ML/HR: 900 INJECTION, SOLUTION INTRAVENOUS at 01:24

## 2017-03-07 RX ADMIN — FAMOTIDINE 20 MG: 20 TABLET ORAL at 09:26

## 2017-03-07 NOTE — PROGRESS NOTES
Pt in MRI, had bouts of complete confusion, trying climbing out of bed, climbing off of MRI Scan table, pushed down MRI scan table arm rails. Had urinary and fecal incontinence in MRI, pt and bed cleaned. Pt has bouts of beligerence wants to sit up, cussing at staffPt MRiIcompleted, as we were moving pt to his bed, asked him to hold his LT arm with his RT hand, because we were moving him to his bed, pt again became confused swung out with RT arm hitting staff technologist in the face. Pt immediatedy apologetic  Crystal of [facility] notified of pt's changing demeanor. Also that he likes to attempt to get out of his bed but is unable to stand.

## 2017-03-07 NOTE — DIABETES MGMT
GLYCEMIC CONTROL PLAN OF CARE    Assessment:  Pt is 73 yr old male admitted on 3/6/17 with CVA. Pt with no past medical history but with HbA1c of 9.5% and family hx of DM. Recommendations:  Recommend place pt on correctional lispro scale ACHS. Diabetic education. Monitor for need to add basal insulin based on BG trends. Will continue to monitor inpatient for intervention. Most recent blood glucose values:  Results for Lisa Arredondo (MRN [id number]) as of 3/7/2017 15:38   Ref.  Range 3/7/2017 08:22 3/7/2017 10:38   GLUCOSE,FAST - POC Latest Ref Range: 70 - 110 mg/dL 241 (H) 220 (H)     Current A1C:  9.5% (3/7/17) equivalent  to ave Blood Glucose of 226mg/dl for 2-3 months prior to admission    Current hospital diabetes medications:  none    Diet:   Regular    Home diabetes medications:  None     Goals:  Pt BG will be within target range by 3/10/17_____    Education:  _x__  Refer to Diabetes Education Record             ___  Education not indicated at this time    Greg Talbert [address], [address] [place] [address], E  Pager: [phone]

## 2017-03-07 NOTE — PROGRESS NOTES
Bedside shift change report given to danette RN (oncoming nurse) by Mike Zhao RN (offgoing nurse). Report included the following information SBAR.

## 2017-03-07 NOTE — ED NOTES
8:43 PM Code S called. Patient had multiple falls. Does not want to give a good history.  Unable to obtain a full history, but patient has left sided weakness, possible neglect of that side

## 2017-03-07 NOTE — PROGRESS NOTES
Recommend:  Regular diet with thin liquids  Meds as tolerated     Speech LAnguage Pathology bedside swallow evaluation AND DISCHARGE    Patient: Edie Whatley (89 y.o. male)  Date: 3/7/2017  Primary Diagnosis: TIA (transient ischemic attack)  CVA (cerebral vascular accident) ([facility] [address]  Precautions: Aspiration       ASSESSMENT :  Clinical beside swallow eval completed per MD orders. Pt A&Ox4. Functional communication with speech intelligibility judged to be 100%. Pt reporting no difficulties with swallowing, stating \"i'm leaving today and I'm not hungry. Cognitive-linguistic function appears intact. OM examination revealed oral motor structures functional for mastication and deglutition. Presented with thin liquid + straw and regular solid trials. Exhibited positive bolus cohesion, manipulation and A-P transit. Further exhibited timely swallow reflex and adequate hyolaryngeal excursion. Pt able to manipulate and clear with 0 clinical s/s aspiration and/or oropharyngeal dysphagia. Pt safe for regular solid, thin liquid diet. 0 formal ST needs for dysphagia indicated at this time. SLP educated pt on role of speech therapist in current setting with re: speech/swallow; verbalized comprehension. SLP available for re-evaluation if indicated by MD. Results d/w RN. PLAN :  Recommendations and Planned Interventions:  No formal ST needs ID'd for dysphagia. Eval only. Discharge Recommendations: None     SUBJECTIVE:   Patient stated I'm leaving later. OBJECTIVE:   History reviewed. No pertinent past medical history. History reviewed. No pertinent surgical history.   Prior Level of Function/Home Situation: Home   Home Situation  Home Environment: Private residence  One/Two Story Residence: One story  Living Alone: No  Support Systems: Family member(s)  Patient Expects to be Discharged to[de-identified] Private residence  Current DME Used/Available at Home: None  Diet prior to admission: Regular, thin liquids   Current Diet:  NPO; recommend regular, thin liquids    Cognitive and Communication Status:  Neurologic State: Alert  Orientation Level: Oriented X4  Cognition: Follows commands  Oral Assessment:  Oral Assessment  Labial: No impairment  Dentition: Intact  Oral Hygiene: Good  Lingual: No impairment  Velum: No impairment  Mandible: No impairment  P.O. Trials:  Patient Position: [facility] at [facility]  Vocal quality prior to P.O.: No impairment  Consistency Presented: Thin liquid; Solid  How Presented: Self-fed/presented;Cup/sip;Spoon;Straw;Successive swallows     Bolus Acceptance: No impairment  Bolus Formation/Control: No impairment     Propulsion: No impairment  Oral Residue: None  Initiation of Swallow: No impairment  Laryngeal Elevation: Functional  Aspiration Signs/Symptoms: None  Pharyngeal Phase Characteristics: No impairment, issues, or problems   Oral Phase Severity: No impairment  Pharyngeal Phase Severity : No impairment    GCODESwallowing:  Swallow Current Status CH= 0%   Swallow Goal Status CH= 0%   Swallow D/C Status CH= 0%    The severity rating is based on the following outcomes:    Clinical judgment    Pain:  Pt reports 0/10 pain or discomfort prior to eval.   Pt reports 0/10 pain or discomfort post eval.     After treatment:   []            Patient left in no apparent distress sitting up in chair  [x]            Patient left in no apparent distress in bed  [x]            Call bell left within reach  [x]            Nursing notified  []            Caregiver present  []            Bed alarm activated    COMMUNICATION/EDUCATION:   [x]            SLP educated pt with regard to compensatory swallow strategies and       aspiration/reflux precautions including: small bites/sips,       alternate liquids/solids, decrease feeding rate, HOB > 45 with all po, and       upright in bed at 30 degrees after po for at least 45       minutes. []            Patient/family have participated as able in goal setting and plan of care.   [] Patient/family agree to work toward stated goals and plan of care. [x]            Patient understands intent and goals of therapy; neutral about participation. []            Patient is unable to participate in goal setting and plan of care.     Thank you for this referral.  Franco Carroll M.S., [id number] [facility]  Speech-Language Pathologist

## 2017-03-07 NOTE — PROGRESS NOTES
Problem: Self Care Deficits Care Plan (Adult)  Goal: *Acute Goals and Plan of Care (Insert Text)  Occupational Therapy Goals  Initiated 3/7/2017 within 7 day(s). 1. Patient will perform lower body dressing with modified independence   2. Patient will perform upper body dressing with modified independence. 3. Patient will perform grooming with modified independence progressing to standing at the sink  4. Patient will perform toilet transfers with minimal assistance/contact guard assist.  OCCUPATIONAL THERAPY EVALUATION     Patient: Marylouise Mortimer (54 y.o. male)  Date: 3/7/2017  Primary Diagnosis: TIA (transient ischemic attack)  CVA (cerebral vascular accident) ([facility] [address]  Precautions: none listed        ASSESSMENT :  Based on the objective data described below, the patient presents with decreased strength/sensation/functional use of his LUE, slowed visual tracking to the left and min to mod assist to sit on the edge of the bed and he declined to stand secondary to his left hip having pain. He is angry and needs to control his session. He is having difficulties following commands secondary to decreased attention to task and his preference to control the situation. He required min assist to use the phone secondary to this. Patient will benefit from skilled intervention to address the above impairments.   Patients rehabilitation potential is considered to be Good  Factors which may influence rehabilitation potential include:   [ ]             None noted  [ ]             Mental ability/status  [ ]             Medical condition  [ ]             Home/family situation and support systems  [ ]             Safety awareness  [ ]             Pain tolerance/management  [ ]             Other:        PLAN :  Recommendations and Planned Interventions:  [ ]               Self Care Training                  [ ]        Therapeutic Activities  [ ]               Functional Mobility Training    [ ]        Cognitive Retraining  [ ] Therapeutic Exercises           [ ]        Endurance Activities  [ ]               Balance Training                   [ ]        Neuromuscular Re-Education  [ ]               Visual/Perceptual Training     [ ]   Home Safety Training  [ ]               Patient Education                 [ ]        Family Training/Education  [ ]               Other (comment):     Frequency/Duration: Patient will be followed by occupational therapy 1-2 times per day/4-7 days per week to address goals. Discharge Recommendations: Inpatient Rehab and [facility]  Further Equipment Recommendations for Discharge: N/A       PATIENT COMPLEXITY      Eval Complexity: History: LOW Complexity : Brief history review ; Examination: LOW Complexity : 1-3 performance deficits relating to physical, cognitive , or psychosocial skils that result in activity limitations and / or participation restrictions ; Decision Making:LOW Complexity : No comorbidities that affect functional and no verbal or physical assistance needed to complete eval tasks  Assessment: LOW Complexity       G-CODES:      Self Care  Current  CL= 60-79%   Goal  CJ= 20-39%. The severity rating is based on the Level of Assistance required for Functional Mobility and ADLs. SUBJECTIVE:   Patient stated My wife is going to come and pick me up soon.       OBJECTIVE DATA SUMMARY:   History reviewed. No pertinent past medical history. History reviewed. No pertinent surgical history.   Prior Level of Function/Home Situation:   Home Situation  Home Environment: Private residence  One/Two Story Residence: One story  Living Alone: No  Support Systems: Family member(s)  Patient Expects to be Discharged to[de-identified] Private residence  Current DME Used/Available at Home: None  [X]  Right hand dominant          [ ]  Left hand dominant  Cognitive/Behavioral Status:  Neurologic State: Alert  Orientation Level: Oriented X4  Skin: no skin integrity issues noted during OT evaluation  Edema: no extremity edema is noted  Vision/Perceptual:      tracking is slowed to the left and convergence/divergence is slowed in left eye     Coordination:   RUE: WFL  LUE: mod impaired secondary to weakness and decreased sensation   Balance:   sitting balance is WFL although he declined to bend forward to simulate or complete LB dressing secondary to his hip pain  Strength:  RUE: 4/5  LUE: 3+/5   Tone & Sensation:  RUE: WFL  LUE: min to mod impaired sensation of proprioception and localization of touch   Range of Motion:  RUE: WFL  LUE: 90% (secondary to decreased sensation)   Functional Mobility and Transfers for ADLs:  Bed Mobility:   supine to sit: mod assist secondary to hip pain   ADL Assessment:   self feeding: set up (simulated)  Grooming: set up (secondary to decreased standing) and occasional min assist secondary to decreased functional use of his LUE  UB bathing/dressing: min assist (simulated)  LB bathing/dressing: mod assist (declined to stand)    Pain:  0/10 pain prior to OT session  0/10 pain following OT session  unrated hip pain during bed mobility (he shouts out when his leg are minimally moved)  Activity Tolerance:   No SOB noted  Please refer to the flowsheet for vital signs taken during this treatment. After treatment:   [ ] Patient left in no apparent distress sitting up in chair  [X] Patient left in no apparent distress in bed  [X] Call bell left within reach  [ ] Nursing notified  [ ] Caregiver present  [ ] Bed alarm activated      COMMUNICATION/EDUCATION:   [ ] Home safety education was provided and the patient/caregiver indicated understanding. [ ] Patient/family have participated as able in goal setting and plan of care. [ ] Patient/family agree to work toward stated goals and plan of care. [X] Patient understands intent and goals of therapy, but is neutral about his/her participation. [ ] Patient is unable to participate in goal setting and plan of care.      Thank you for this referral.  Scotty Mendez, OTR/L  Time Calculation: 11 mins

## 2017-03-07 NOTE — ROUTINE PROCESS
TRANSFER - IN REPORT:    Verbal report received from Lavinia(name) on Edie Whatley  being received from ED(unit) for routine progression of care      Report consisted of patients Situation, Background, Assessment and   Recommendations(SBAR). Information from the following report(s) SBAR, Kardex, ED Summary, Intake/Output, MAR, Recent Results and Med Rec Status was reviewed with the receiving nurse. Opportunity for questions and clarification was provided. Assessment completed upon patients arrival to unit and care assumed.      Primary Nurse Chioma Sarabia RN and Janet Boggs RN performed a dual skin assessment on this patient No impairment noted  Jevon score is 20

## 2017-03-07 NOTE — H&P
History and Physical    Patient: Marysol Leiva MRN: [id number]  SSN: [id number]    YOB: 1945  Age: 73 y.o. Sex: male      Subjective: Marysol Leiva is a 73 y.o. male with PMH of no significant medical disorder reported being admitted for acute CVA. Patient is not very cooperative in giving detail history ,   Per ED patient had multiple falls at home and he reported Left LL weakness, he was brought to ED and patient had clinical weakness on Left side , Code S was called and tele neuro was consulted , No TPA recommended , but advised admission for further care     Patient used to drink and smoke in past but according to him he stopped long time ago     Patient denies Fever , Chills , Weight loss or Weight Gain , No SOB, No Cough , no Hemoptysis , No Chest pains , No Palpitations , No NVD . Full CODE   DVT prophylaxis - Heparin     History reviewed. No pertinent past medical history. History reviewed. No pertinent surgical history. History reviewed. No pertinent family history. Social History   Substance Use Topics    Smoking status: Not on file    Smokeless tobacco: Not on file    Alcohol use Not on file      Prior to Admission medications    Not on File        No Known Allergies    Review of Systems:  A comprehensive review of systems was negative except for that written in the History of Present Illness.     Objective:     Vitals:    03/06/17 2300 03/06/17 2315 03/06/17 2330 03/06/17 2345   BP:       Pulse: 88 89 83 86   Resp: 16 20 18 18   Temp:       SpO2: 96% 97% 98% 98%        Physical Exam:  General appearance - alert, well appearing, and in no distress  Mental status - alert, oriented to person, place, and time  Eyes - pupils equal and reactive, extraocular eye movements intact  Ears - bilateral TM's and external ear canals normal  Nose - normal and patent, no erythema, discharge or polyps  Mouth - mucous membranes moist, pharynx normal without lesions  Neck - supple, no significant adenopathy  Chest - clear to auscultation, no wheezes, rales or rhonchi, symmetric air entry  Heart - normal rate, regular rhythm, normal S1, S2, no murmurs, rubs, clicks or gallops  Abdomen - soft, nontender, nondistended, no masses or organomegaly   Neuro : Left upper extremity 4/5 and Left Lower extremity 3/5 strength , further exam - patient is not cooperating   Ext : No Edema       Assessment:     Hospital Problems  Date Reviewed: 3/6/2017          Codes Class Noted POA    CVA (cerebral vascular accident) [facility]) ICD-10-CM: I63.9  ICD-9-CM: 434.91  3/7/2017 Unknown        TIA (transient ischemic attack) ICD-10-CM: G45.9  ICD-9-CM: 435.9  3/6/2017 Unknown        Acute CVA (cerebrovascular accident) [facility]) ICD-10-CM: I63.9  ICD-9-CM: 434.91  3/6/2017 Unknown              CBC:  Lab Results   Component Value Date/Time    WBC 16.6 03/06/2017 10:45 PM    HGB 17.7 03/06/2017 10:45 PM    HCT 49.8 03/06/2017 10:45 PM    PLATELET 478 27/16/9689 10:45 PM    MCV 85.3 03/06/2017 10:45 PM        CMP:  Lab Results   Component Value Date/Time    Sodium 137 03/06/2017 10:45 PM    Potassium 3.8 03/06/2017 10:45 PM    Chloride 102 03/06/2017 10:45 PM    CO2 28 03/06/2017 10:45 PM    Anion gap 7 03/06/2017 10:45 PM    Glucose 204 03/06/2017 10:45 PM    BUN 13 03/06/2017 10:45 PM    Creatinine 1.27 03/06/2017 10:45 PM    BUN/Creatinine ratio 10 03/06/2017 10:45 PM    GFR est AA >60 03/06/2017 10:45 PM    GFR est non-AA 56 03/06/2017 10:45 PM    Calcium 8.9 03/06/2017 10:45 PM    AST (SGOT) 11 03/06/2017 10:45 PM    Alk.  phosphatase 97 03/06/2017 10:45 PM    Protein, total 8.4 03/06/2017 10:45 PM    Albumin 3.6 03/06/2017 10:45 PM    Globulin 4.8 03/06/2017 10:45 PM    A-G Ratio 0.8 03/06/2017 10:45 PM    ALT (SGPT) 13 03/06/2017 10:45 PM        PT/INR  Lab Results   Component Value Date/Time    INR 1.1 03/06/2017 10:45 PM    Prothrombin time 13.5 03/06/2017 10:45 PM            EKG: No results found for this or any previous visit.       Plan:   Left side weakness   - ?  New changes Vs old weakness related to OA   - TIA Vs CVA   - CT head - no Acute changes   - Follow up MRI   - Carotid and ECHO   - Neurology consulted by ED   - ASA  - Speech eval for swallowing   - Risk factors identification - Check lipids     Hyperglycemia with out diagnosis of DM   - SSI and HbA1c       Signed By: Tramaine Perales MD     March 7, 2017

## 2017-03-07 NOTE — PROGRESS NOTES
Problem: Mobility Impaired (Adult and Pediatric)  Goal: *Acute Goals and Plan of Care (Insert Text)  Physical Therapy Goals  Initiated 3/7/2017 and to be accomplished within 7 day(s)  1. Patient will move from supine to sit and sit to supine , scoot up and down and roll side to side in bed with contact guard assist.   2. Patient will transfer from bed to chair and chair to bed with minimal assistance/contact guard assist using the least restrictive device. 3. Patient will perform sit to stand with minimal assistance. 4. Patient will ambulate with minimal assistance/contact guard assist for 50 feet with the least restrictive device. 5. Patient will tolerate sitting up in chair for 3-4hr/day in order to improve respiratory capacity and maintain strength for carryover into functional tasks. 6. Patient will perform HEP with minimal assistance in order to improve strength for carryover into functional tasks. PHYSICAL THERAPY EVALUATION     Patient: Janell Ortiz (39 y.o. male)  Date: 3/7/2017  Primary Diagnosis: TIA (transient ischemic attack)  CVA (cerebral vascular accident) [facility])        Precautions: Fall         ASSESSMENT :  Pt is 71yo M admitted to hospital for possible CVA and presents today alert and agreeable to eval. Pt visibly frustrated throughout session and demonstrates decreased insight into his deficits. Pt tearful at conclusion of session at PT explained rehab process and recommendation for further skilled therapy upon D/C. Pt transferred sup to sit with ModA and stood in crouched posture with MaxA. Pt unwilling to attempt 2nd stand despite Max encouragement from PT. Time spent educating pt on deficits and POC moving forward. Pt transferred back into bed with González and required MaxAx2 to scoot towards [address]. Pt currently demonstrates decreased strength, mobility, and endurance and will benefit from skilled intervention to address the above impairments.   Patients rehabilitation potential is considered to be Fair  Factors which may influence rehabilitation potential include:   [ ]         None noted  [X]         Mental ability/status  [X]         Medical condition  [X]         Home/family situation and support systems  [X]         Safety awareness  [X]         Pain tolerance/management  [ ]         Other:        PLAN :  Recommendations and Planned Interventions:  [X]           Bed Mobility Training             [X]    Neuromuscular Re-Education  [X]           Transfer Training                   [ ]    Orthotic/Prosthetic Training  [X]           Gait Training                          [ ]    Modalities  [X]           Therapeutic Exercises          [ ]    Edema Management/Control  [X]           Therapeutic Activities            [X]    Patient and Family Training/Education  [ ]           Other (comment):     Frequency/Duration: Patient will be followed by physical therapy 1-2 times per day/4-7 days per week to address goals. Discharge Recommendations: Rehab  Further Equipment Recommendations for Discharge: N/A       G-CODES      Mobility  Current  CL= 60-79%   Goal  CJ= 20-39%. The severity rating is based on the Level of Assistance required for Functional Mobility and ADLs.            G-CODES      Eval Complexity: History: MEDIUM  Complexity : 1-2 comorbidities / personal factors will impact the outcome/ POC Exam:MEDIUM Complexity : 3 Standardized tests and measures addressing body structure, function, activity limitation and / or participation in recreation  Presentation: LOW Complexity : Stable, uncomplicated  Clinical Decision Making:Low Complexity   Overall Complexity:LOW       SUBJECTIVE:   Patient stated I don't know. I'll get in the wheel chair to the car and I'll figure it out when I get home.  when PT inquired about mobility at home as pt having difficulty with mobility during eval.       OBJECTIVE DATA SUMMARY:   History reviewed. No pertinent past medical history. History reviewed.  No pertinent surgical history. Prior Level of Function/Home Situation: Pt lives at home with his wife who works; live in 1 story home with 2 KIRAN and pt was independent with mobility and I/ADL's PTA. Pt has no AD/DME at home. Home Situation  Home Environment: Private residence  One/Two Story Residence: One story  Living Alone: No  Support Systems: Family member(s)  Patient Expects to be Discharged to[de-identified] Private residence  Current DME Used/Available at Home: None  Critical Behavior:  Neurologic State: Alert  Orientation Level: Oriented X4  Cognition: Follows commands   Decreased insight into deficits, pt seemingly frustrated throughout session; tearful while PT education on rehab process     Strength:    Strength: Generally decreased, functional (L knee ext 2+/5, DF 1/5, RLE 4/5)   Tone & Sensation:    Sensation: Intact (BLE to LT)   Range Of Motion:  AROM: Generally decreased, functional (LLE diminished 2/2 strength deficits)   Functional Mobility:  Bed Mobility:   Supine to Sit: Moderate assistance  Sit to Supine: Minimum assistance  Scooting: Maximum assistance;Assist x2  Transfers:  Sit to Stand: Maximum assistance  Stand to Sit: Maximum assistance      Pt came to crouched standing position and refused 2nd attempt to stand, becoming very frustrated. Balance:   Sitting: Impaired  Sitting - Static: Fair (occasional)  Sitting - Dynamic: Poor (constant support)  Standing: Impaired; With support  Standing - Static: Poor  Ambulation/Gait Training:   Pt unable to ambulate safely at this time    Pain:  Pt reports 3/10 pain or discomfort prior to treatment.  (L knee-arthritis)  Pt reports 3/10 pain or discomfort post treatment. Activity Tolerance:   Pt demonstrated decreased tolerance to activity 2/2 to decreased functional abitliy and endruance. Pt frustrated throughout session and required education on POC and rehab as pt demonstrated poor insight into deficits.    Please refer to the flowsheet for vital signs taken during this treatment. After treatment:   [ ]         Patient left in no apparent distress sitting up in chair  [X]         Patient left in no apparent distress in bed  [X]         Call bell left within reach  [ ]         Nursing notified  [ ]         Caregiver present  [ ]         Bed alarm activated      COMMUNICATION/EDUCATION:   [X]         Fall prevention education was provided and the patient/caregiver indicated understanding. [X]         Patient/family have participated as able in goal setting and plan of care. [X]         Patient/family agree to work toward stated goals and plan of care. [ ]         Patient understands intent and goals of therapy, but is neutral about his/her participation. [ ]         Patient is unable to participate in goal setting and plan of care.      Thank you for this referral.  Jewel Olvera, PT   Time Calculation: 23 mins

## 2017-03-07 NOTE — ED NOTES
Pt arrived to the ED by EMS with co Left sided weakness onset today approx 1400. Wife states pt speech is at baseline. Pt A&Ox4. Pt stated, 'I'm here against my will. \" Pt denies HA NV. 22 G R hand by EMS. Pt stated he fell 3 x today denies LOC or injuries. Pt denies numbness or tingling.

## 2017-03-07 NOTE — PROGRESS NOTES
[facility] Hospitalist Group  Progress Note    Patient: Washington Zamudio Age: 73 y.o. : 1945 MR#: [id number] SSN: [id number]  Date/Time: 3/7/2017 3:29 PM    Subjective:     Seen with family @ bedside. Denies pain, F/C, N/V, CP, SOB. Reports long-standing L knee pain, which makes it difficult for him to ambulate. Denies dizzy/lightheadedness. Denies weakness. Assessment/Plan:   1. Acute CVA - ASA. Statin. Workup in process. 2. L knee pain - will check x-rays. PT/OT. 3. New DM - no prior dx. HgbA1c of 9.5%. SSI. Will begin PO med at time of discharge. Will need new PCP. 4. MR, echo studies pending. Additional Notes:    CT head:   No definite acute ischemic change or hemorrhage. Possible small lacunar infarct posterosuperior right lenticular nucleus, age indeterminate. Mild burden nonspecific deep cerebral white matter lucencies, most likely chronic microvascular ischemic change. Mild age-related atrophy without other specific parenchymal abnormality. Case discussed with:  [x]Patient  [x]Family  []Nursing  []Case Management  DVT Prophylaxis:  []Lovenox  [x]Hep SQ  []SCDs  []Coumadin   []On Heparin gtt    Objective:   VS:   Visit Vitals    /67 (BP 1 Location: Right arm, BP Patient Position: At rest)    Pulse 85    Temp 97.4 °F (36.3 °C)    Resp 20    Ht 5' 9\" (1.753 m)    Wt 88.2 kg (194 lb 6.4 oz)    SpO2 97%    BMI 28.71 kg/m2      Tmax/24hrs: Temp (24hrs), Av °F (36.7 °C), Min:97.3 °F (36.3 °C), Max:98.7 °F (37.1 °C)    Intake/Output Summary (Last 24 hours) at 17 1529  Last data filed at 17 1200   Gross per 24 hour   Intake              460 ml   Output                0 ml   Net              460 ml       General:  Awake, alert, NAD. Cardiovascular:  RRR. Pulmonary:  CTA B.  GI:  Soft, NT/ND, NABS. Extremities:  No CT or edema. Additional:  5/ motor x4.     Labs:    Recent Results (from the past 24 hour(s))   EKG, 12 LEAD, INITIAL Collection Time: 03/06/17  8:31 PM   Result Value Ref Range    Ventricular Rate 95 BPM    Atrial Rate 95 BPM    P-R Interval 136 ms    QRS Duration 78 ms    Q-T Interval 350 ms    QTC Calculation (Bezet) 439 ms    Calculated P Axis -11 degrees    Calculated R Axis 34 degrees    Calculated T Axis 63 degrees    Diagnosis       Normal sinus rhythm  Normal ECG  No previous ECGs available  Confirmed by Marvin Gill MD, Nila Mcdonough ([id number]) on 3/7/2017 2:20:10 PM     CBC WITH AUTOMATED DIFF    Collection Time: 03/06/17 10:45 PM   Result Value Ref Range    WBC 16.6 (H) 4.6 - 13.2 K/uL    RBC 5.84 (H) 4.70 - 5.50 M/uL    HGB 17.7 (H) 13.0 - 16.0 g/dL    HCT 49.8 (H) 36.0 - 48.0 %    MCV 85.3 74.0 - 97.0 FL    MCH 30.3 24.0 - 34.0 PG    MCHC 35.5 31.0 - 37.0 g/dL    RDW 13.5 11.6 - 14.5 %    PLATELET 239 976 - 889 K/uL    MPV 9.6 9.2 - 11.8 FL    NEUTROPHILS 73 40 - 73 %    LYMPHOCYTES 18 (L) 21 - 52 %    MONOCYTES 6 3 - 10 %    EOSINOPHILS 2 0 - 5 %    BASOPHILS 1 0 - 2 %    ABS. NEUTROPHILS 12.3 (H) 1.8 - 8.0 K/UL    ABS. LYMPHOCYTES 2.9 0.9 - 3.6 K/UL    ABS. MONOCYTES 1.0 0.05 - 1.2 K/UL    ABS. EOSINOPHILS 0.3 0.0 - 0.4 K/UL    ABS.  BASOPHILS 0.1 (H) 0.0 - 0.06 K/UL    DF AUTOMATED     METABOLIC PANEL, BASIC    Collection Time: 03/06/17 10:45 PM   Result Value Ref Range    Sodium 137 136 - 145 mmol/L    Potassium 3.8 3.5 - 5.5 mmol/L    Chloride 102 100 - 108 mmol/L    CO2 28 21 - 32 mmol/L    Anion gap 7 3.0 - 18 mmol/L    Glucose 204 (H) 74 - 99 mg/dL    BUN 13 7.0 - 18 MG/DL    Creatinine 1.27 0.6 - 1.3 MG/DL    BUN/Creatinine ratio 10 (L) 12 - 20      GFR est AA >60 >60 ml/min/1.73m2    GFR est non-AA 56 (L) >60 ml/min/1.73m2    Calcium 8.9 8.5 - 10.1 MG/DL   CARDIAC PANEL,(CK, CKMB & TROPONIN)    Collection Time: 03/06/17 10:45 PM   Result Value Ref Range    CK 65 39 - 308 U/L    CK - MB 1.2 <3.6 ng/ml    CK-MB Index 1.8 0.0 - 4.0 %    Troponin-I, Qt. <0.02 0.0 - 0.045 NG/ML   HEPATIC FUNCTION PANEL    Collection Time: 03/06/17 10:45 PM   Result Value Ref Range    Protein, total 8.4 (H) 6.4 - 8.2 g/dL    Albumin 3.6 3.4 - 5.0 g/dL    Globulin 4.8 (H) 2.0 - 4.0 g/dL    A-G Ratio 0.8 0.8 - 1.7      Bilirubin, total 0.6 0.2 - 1.0 MG/DL    Bilirubin, direct 0.1 0.0 - 0.2 MG/DL    Alk.  phosphatase 97 45 - 117 U/L    AST (SGOT) 11 (L) 15 - 37 U/L    ALT (SGPT) 13 (L) 16 - 61 U/L   PROTHROMBIN TIME + INR    Collection Time: 03/06/17 10:45 PM   Result Value Ref Range    Prothrombin time 13.5 11.5 - 15.2 sec    INR 1.1 0.8 - 1.2     GLUCOSE, POC    Collection Time: 03/07/17  8:22 AM   Result Value Ref Range    Glucose (POC) 241 (H) 70 - 110 mg/dL   GLUCOSE, POC    Collection Time: 03/07/17 10:38 AM   Result Value Ref Range    Glucose (POC) 220 (H) 70 - 110 mg/dL   HEMOGLOBIN A1C WITH EAG    Collection Time: 03/07/17  1:20 PM   Result Value Ref Range    Hemoglobin A1c 9.5 (H) 4.2 - 5.6 %    Est. average glucose 226 mg/dL       Signed By: Lauren Olvera MD     March 7, 2017 3:29 PM

## 2017-03-07 NOTE — PROGRESS NOTES
responded to Code S for  Oren Hong, who is a 73 y.o.,male,     When I entered the room the patient seemed agitated and very irritable for being in the hospital. His wife, Alejandra Petre was signing some papers outside of the room. Marisel Bean appeared to be concerned about her .  educated patient about spiritual services and available of services. When the nurse stepped into the room  went to check on the patient's wife. Marisel Bean mentioned that patient was outside earlier and felt numbness on his knee. His neighbor took him inside of the house and later his wife came from work and saw the patient sitting on the chair complaining that he was not feeling his leg. Wife called the ambulance against the patient's will. Patient has a son, Kylah Larkin (23 y.o [address] [facility]), who is in [place]. Kylah Larkin called Marisel Bean when they arrived in the hospital.    Patient appeared to be alert and aware of his surroundings. Doctor Marcelina Back, entered the room and patient's wife and  followed the doctor into the room to hear the diagnosis. Doctor explained to the patient that he had possibly a Stroke earlier and he could have a stroke again. Patient was very irritable to hear that and told the doctor that he wanted to withdraw from care. Nurse came in after the doctor left to share to the patient what stroke meant. Patient seemed to be distraught and raised his voice saying that he does not care if he dies. Marisel Bean who took a sit by the door had tears when patient shared this statement with the nurse.  suggested patient to stay. Patient agreed to stay if he gets some frozen food. After few minutes with the family patient shared that he is not afraid of death. He has chronic pain and he is tired of pain. Patient also shared stories about how he met his wife in [facility], and how he loves her more now then before. \"Her character,\" says the patient \"makes him to fall in love with her daily. \"   Patient appreciated my visit   Chaplains will continue to follow and will provide pastoral care as needed. The  provided the following Interventions:  Provided crisis pastoral care and pastoral support to patient and his wife, Katherin Coronel. Offered prayers on behalf for the patient. Plan:  Chaplains will continue to follow and will provide pastoral care on an as requested basis.  will spiritually assess the patient.  will gently address patient's irritabilities concerning hospitalization.      [address] [facility] Max Flores  [phone]

## 2017-03-07 NOTE — ED NOTES
Pt removed BP cuff and yelled, \"I'm not putting it on there! It gets too tight! I don't need it! \" Pt uncooperative.

## 2017-03-07 NOTE — PROGRESS NOTES
Care Management Interventions  Transition of Care Consult (CM Consult): Discharge Planning  Physical Therapy Consult: Yes  Occupational Therapy Consult: Yes  Speech Therapy Consult: Yes  Current Support Network: Lives with Spouse  Confirm Follow Up Transport: Family  Plan discussed with Pt/Family/Caregiver: Yes     Pt is a 73year old admitted for TIA, CVA. Pt is alert and oriented with his spouse Marycruz Castaneda ([phone]) and his niece at his bedside. Pt states that he was independent with ADLs and ambulation and resides at home with his spouse prior to admission. Pt requested to see the list of SNFs. Family wants to review and discuss their options.

## 2017-03-07 NOTE — ED NOTES
Pt yelling, \"I want to go home and sleep in my own bed! \" Pt instructions about strokes and stroke like symptoms reviewed with pt and family. Pt verbalized understanding. Pt signed AMA form. Pt provided a copy. Spouse at bedside and verbalized understanding. Dandy NAVARRO reviewed care and AMA form with pt and spouse. Pt assisted to wheel chair and taken to lobby awaiting his ride home.
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
## 2017-03-07 NOTE — ED PROVIDER NOTES
HPI Comments: 8:33 PM Grace Espinoza is a 73 y.o. Male who presents to ED via EMS for an evaluation of left sided weakness. Per EMS patient was last seen normal before 2 PM today since 2 PM is when his symptoms of weakness and falling started. They state that the patient had to be helped up by his neighbors and had 3-4 falling episodes since then. Patient, upon arrival to the ED, denies any chest pain and explains that his left leg weakness is due to him being in the [employer] for many years. Patient history limited due to patient not being able to provide additional history of his symptoms today. Neighbor is also not a great historian. PCP: Tona Curry MD    The history is provided by the patient, the EMS personnel and a friend. History reviewed. No pertinent past medical history. History reviewed. No pertinent surgical history. History reviewed. No pertinent family history. Social History     Social History    Marital status:      Spouse name: N/A    Number of children: N/A    Years of education: N/A     Occupational History    Not on file. Social History Main Topics    Smoking status: Not on file    Smokeless tobacco: Not on file    Alcohol use Not on file    Drug use: Not on file    Sexual activity: Not on file     Other Topics Concern    Not on file     Social History Narrative    No narrative on file         ALLERGIES: Review of patient's allergies indicates no known allergies. Review of Systems   Constitutional: Negative for activity change, appetite change, diaphoresis and fever. HENT: Negative for congestion, dental problem, ear pain, hearing loss, nosebleeds, postnasal drip, sinus pressure, sneezing and tinnitus. Eyes: Negative for photophobia, discharge, redness and visual disturbance. Respiratory: Negative for cough, choking, shortness of breath, wheezing and stridor.     Cardiovascular: Negative for chest pain, palpitations and leg swelling. Gastrointestinal: Negative for abdominal distention, abdominal pain, anal bleeding and blood in stool. Genitourinary: Negative for decreased urine volume, difficulty urinating, discharge, dysuria, frequency, hematuria, penile swelling, scrotal swelling, testicular pain and urgency. Musculoskeletal: Negative for arthralgias, back pain, gait problem, joint swelling, myalgias and neck pain. Skin: Negative for color change and pallor. Neurological: Positive for weakness (left sided, left arm, left leg). Negative for dizziness, tremors, seizures, syncope and headaches. Hematological: Negative for adenopathy. Does not bruise/bleed easily. Psychiatric/Behavioral: Negative for agitation, behavioral problems, confusion and hallucinations. The patient is not nervous/anxious. Vitals:    03/06/17 2115 03/06/17 2130 03/06/17 2145 03/06/17 2236   BP: 137/75   131/71   Pulse: 92 86 85 93   Resp: 17 14 16 18   Temp:       SpO2: 100% 100% 98% 99%            Physical Exam   Constitutional: He is oriented to person, place, and time. He appears well-developed and well-nourished. HENT:   Head: Normocephalic and atraumatic. Right Ear: External ear normal.   Left Ear: External ear normal.   Nose: Nose normal.   Mouth/Throat: Oropharynx is clear and moist.   Eyes: Conjunctivae and EOM are normal. Pupils are equal, round, and reactive to light. Right eye exhibits no discharge. No scleral icterus. Neck: Normal range of motion. Neck supple. No JVD present. No thyromegaly present. Cardiovascular: Normal rate, regular rhythm and intact distal pulses. Exam reveals no gallop and no friction rub. No murmur heard. Pulmonary/Chest: No respiratory distress. He has no wheezes. He has no rales. He exhibits no tenderness. Lungs are clear. Abdominal: Soft. He exhibits no distension and no mass. There is no tenderness. There is no rebound and no guarding. Musculoskeletal: Normal range of motion.  He exhibits no edema. No lower extremity edema. Lymphadenopathy:     He has no cervical adenopathy. Neurological: He is alert and oriented to person, place, and time. No cranial nerve deficit. Coordination normal.   Face symmetrical. 3/5 weakness of left arm and left leg. Skin: Skin is warm and dry. No rash noted. No erythema. Psychiatric: His speech is normal. Judgment normal. His affect is angry. Patient not cooperative with exam.    Nursing note and vitals reviewed. MDM  Number of Diagnoses or Management Options    ED Course       Procedures    Vitals:  Patient Vitals for the past 12 hrs:   Temp Pulse Resp BP SpO2   03/06/17 2236 - 93 18 131/71 99 %   03/06/17 2145 - 85 16 - 98 %   03/06/17 2130 - 86 14 - 100 %   03/06/17 2115 - 92 17 137/75 100 %   03/06/17 2032 98.1 °F (36.7 °C) 95 17 (!) 157/93 98 %     99% on RA, indicating adequate oxygenation. Medications ordered:   Medications   aspirin (ASPIRIN) tablet 325 mg (325 mg Oral Given 3/6/17 2110)         Lab findings:  No results found for this or any previous visit (from the past 12 hour(s)). EKG interpretation by ED Physician:  EKG was interpreted by me at 20:38 and showed normal sinus rhythm at a rate of 95 bpm. No STEMI. X-Ray, CT or other radiology findings or impressions:  CT HEAD WO CONT   Final Result      XR CHEST PORT    (Results Pending)     CT HEAD WO CONT was negative. XR CHEST PORT was interpreted by me at 21:28 and showed no acute changes. Progress notes, Consult notes or additional Procedure notes:   9:19 PM - I spoke to the patient about the results of his imaging, and he informed me that he would like to sign out against medical advice. Patient was informed of the risks, benefits, and alternatives to staying in the ER. He has the capacity to make his own decision. Patient is alert and oriented to time, place, and person. I informed him that the weakness in his arm, although resolved, may be a sign of a CVA. Patient understood this and still wants to sign out AMA. Patient has been instructed to return to the ER for any new or worsening symptoms. 10:36 PM - Patient returned to ER and stated that he will stay in the hospital. I will call the hospitalist to discuss patient's case and possible admission. 10:43 PM - I spoke to the hospitalist, Dr. Lazarus Schools, and he agrees with admission. Disposition:  Diagnosis:   1. Transient cerebral ischemia, unspecified type        Disposition: Admit    Follow-up Information     None           Patient's Medications    No medications on file     Scribe [address] [place] [address] for and in the presence of Liam Soto MD 8:42 PM, 03/06/17. Signed by: Chapincito Choudhary, 8:42 PM, 03/06/17. Provider Attestation:   I personally performed the services described in the documentation, reviewed the documentation, as recorded by the scribe in my presence, and it accurately and completely records my words and actions.  March 07, 2017 at 8:16 PM - Jacqui Chamberlain MD

## 2017-03-07 NOTE — ED NOTES
Pt sleeping on stretcher in no apparent distress at this time. Spouse at bedside. Lights dimmed per pt request. Pt provided blankets per request. Will continue to monitor.

## 2017-03-07 NOTE — DIABETES MGMT
Diabetes Patient/Family Education Record  Pt newly dx T2DM. Discussed pathophysiology of DM and treatment with receptive family. Will f/u with more education on Thursday. Pt has family hx of T2DM and is familiar with it. Factors That  May Influence Patients Ability  to Learn or  Comply With  Recommendations:    []   Language barrier    []   Cultural needs   []   Motivation    []   Cognitive limitation    []   Physical   [x]   Education    []   Physiological factors   []   Hearing/vision/speaking impairment   []   Advent beliefs    []   Financial factors   []  Other:   []  No factors identified at this time.      Person Instructed:   [x]   Patient   [x]   Family   []  Other     Preference for Learning:   [x]   Verbal   [x]   Written   []  Demonstration     Level of Comprehension & Competence:    []  Good                                      [x] Fair                                     []  Poor                             [x]  Needs Reinforcement   [x]  Teachback completed    Education Component:   [x]  Medication management, discussed insulin pt would be on in hospital   [x]  Nutritional management   []  Exercise   []  Signs, symptoms, and treatment of hyperglycemia and hypoglycemia   []  Prevention, recognition and treatment of hyperglycemia and hypoglycemia   []  Importance of blood glucose monitoring and how to obtain a blood glucose meter    []  Instruction on use of blood glucose meter   [x]  Discuss the importance of HbA1C monitoring and provide patient with  results   []  Sick day guidelines   []  Proper use and disposal of lancets, needles, syringes or insulin pens (if appropriate)   []  Potential long-term complications (retinopathy, kidney disease, neuropathy, heart disease, stroke, vascular disease, foot care)   [x] Provide emergency contact number and contact number for more information    [x]  Goal:  Patient/family will demonstrate understanding of Diabetes Self Management Skills by: (date) __3/14/17_____  Plan for post-discharge education or self-management support:    [] Outpatient class schedule provided            [] Patient Declined    [] Scheduled for outpatient classes (date) _______       Charlette Avitia MS, RD, CDE  Pager: [phone]

## 2017-03-07 NOTE — ED NOTES
MRI screening form reviewed with pt. Pt verbalized understanding of form and signed. Wife at bedside.  Form faxed to MRI

## 2017-03-07 NOTE — ED NOTES
TRANSFER - OUT REPORT:    Verbal report given to Jasbir Correa RN(name) on Heriberto Pearson  being transferred to [address] [place] [address](unit) for routine progression of care   Report consisted of patients Situation, Background, Assessment and   Recommendations(SBAR). Information from the following report(s) SBAR, ED Summary, Procedure Summary, Intake/Output, MAR, Recent Results and Cardiac Rhythm NSR was reviewed with the receiving nurse. Opportunity for questions and clarification was provided.       Patient transported with:   Monitor  Registered Nurse

## 2017-03-08 LAB
ANION GAP BLD CALC-SCNC: 9 MMOL/L (ref 3–18)
APPEARANCE UR: CLEAR
BASOPHILS # BLD AUTO: 0.1 K/UL (ref 0–0.06)
BASOPHILS # BLD: 0 % (ref 0–2)
BILIRUB UR QL: NEGATIVE
BUN SERPL-MCNC: 11 MG/DL (ref 7–18)
BUN/CREAT SERPL: 11 (ref 12–20)
CALCIUM SERPL-MCNC: 8.9 MG/DL (ref 8.5–10.1)
CHLORIDE SERPL-SCNC: 102 MMOL/L (ref 100–108)
CO2 SERPL-SCNC: 27 MMOL/L (ref 21–32)
COLOR UR: YELLOW
CREAT SERPL-MCNC: 0.98 MG/DL (ref 0.6–1.3)
DIFFERENTIAL METHOD BLD: ABNORMAL
EOSINOPHIL # BLD: 0.3 K/UL (ref 0–0.4)
EOSINOPHIL NFR BLD: 2 % (ref 0–5)
ERYTHROCYTE [DISTWIDTH] IN BLOOD BY AUTOMATED COUNT: 13.6 % (ref 11.6–14.5)
GLUCOSE BLD STRIP.AUTO-MCNC: 145 MG/DL (ref 70–110)
GLUCOSE BLD STRIP.AUTO-MCNC: 183 MG/DL (ref 70–110)
GLUCOSE BLD STRIP.AUTO-MCNC: 200 MG/DL (ref 70–110)
GLUCOSE SERPL-MCNC: 199 MG/DL (ref 74–99)
GLUCOSE UR STRIP.AUTO-MCNC: 250 MG/DL
HCT VFR BLD AUTO: 48.1 % (ref 36–48)
HGB BLD-MCNC: 16.6 G/DL (ref 13–16)
HGB UR QL STRIP: NEGATIVE
KETONES UR QL STRIP.AUTO: 40 MG/DL
LEUKOCYTE ESTERASE UR QL STRIP.AUTO: NEGATIVE
LYMPHOCYTES # BLD AUTO: 15 % (ref 21–52)
LYMPHOCYTES # BLD: 2.4 K/UL (ref 0.9–3.6)
MCH RBC QN AUTO: 29.9 PG (ref 24–34)
MCHC RBC AUTO-ENTMCNC: 34.5 G/DL (ref 31–37)
MCV RBC AUTO: 86.7 FL (ref 74–97)
MONOCYTES # BLD: 1.2 K/UL (ref 0.05–1.2)
MONOCYTES NFR BLD AUTO: 8 % (ref 3–10)
NEUTS SEG # BLD: 12 K/UL (ref 1.8–8)
NEUTS SEG NFR BLD AUTO: 75 % (ref 40–73)
NITRITE UR QL STRIP.AUTO: NEGATIVE
PH UR STRIP: 5 [PH] (ref 5–8)
PLATELET # BLD AUTO: 262 K/UL (ref 135–420)
PMV BLD AUTO: 9.9 FL (ref 9.2–11.8)
POTASSIUM SERPL-SCNC: 4.2 MMOL/L (ref 3.5–5.5)
PROT UR STRIP-MCNC: NEGATIVE MG/DL
RBC # BLD AUTO: 5.55 M/UL (ref 4.7–5.5)
SODIUM SERPL-SCNC: 138 MMOL/L (ref 136–145)
SP GR UR REFRACTOMETRY: 1.02 (ref 1–1.03)
UROBILINOGEN UR QL STRIP.AUTO: 1 EU/DL (ref 0.2–1)
WBC # BLD AUTO: 16 K/UL (ref 4.6–13.2)

## 2017-03-08 PROCEDURE — 74011250637 HC RX REV CODE- 250/637: Performed by: HOSPITALIST

## 2017-03-08 PROCEDURE — 80048 BASIC METABOLIC PNL TOTAL CA: CPT | Performed by: HOSPITALIST

## 2017-03-08 PROCEDURE — 87086 URINE CULTURE/COLONY COUNT: CPT | Performed by: HOSPITALIST

## 2017-03-08 PROCEDURE — 97530 THERAPEUTIC ACTIVITIES: CPT

## 2017-03-08 PROCEDURE — 82962 GLUCOSE BLOOD TEST: CPT

## 2017-03-08 PROCEDURE — 74011250637 HC RX REV CODE- 250/637: Performed by: INTERNAL MEDICINE

## 2017-03-08 PROCEDURE — 74011250637 HC RX REV CODE- 250/637: Performed by: FAMILY MEDICINE

## 2017-03-08 PROCEDURE — 36415 COLL VENOUS BLD VENIPUNCTURE: CPT | Performed by: HOSPITALIST

## 2017-03-08 PROCEDURE — 74011636637 HC RX REV CODE- 636/637: Performed by: HOSPITALIST

## 2017-03-08 PROCEDURE — 81003 URINALYSIS AUTO W/O SCOPE: CPT | Performed by: HOSPITALIST

## 2017-03-08 PROCEDURE — 85025 COMPLETE CBC W/AUTO DIFF WBC: CPT | Performed by: HOSPITALIST

## 2017-03-08 PROCEDURE — 74011636637 HC RX REV CODE- 636/637: Performed by: INTERNAL MEDICINE

## 2017-03-08 PROCEDURE — 65660000000 HC RM CCU STEPDOWN

## 2017-03-08 PROCEDURE — 74011250636 HC RX REV CODE- 250/636: Performed by: INTERNAL MEDICINE

## 2017-03-08 RX ORDER — SODIUM CHLORIDE 9 MG/ML
10 INJECTION INTRAMUSCULAR; INTRAVENOUS; SUBCUTANEOUS
Status: DISCONTINUED | OUTPATIENT
Start: 2017-03-08 | End: 2017-03-08 | Stop reason: SDDI

## 2017-03-08 RX ORDER — GUAIFENESIN 100 MG/5ML
81 LIQUID (ML) ORAL DAILY
Status: DISCONTINUED | OUTPATIENT
Start: 2017-03-09 | End: 2017-03-10 | Stop reason: HOSPADM

## 2017-03-08 RX ORDER — HYDROCODONE BITARTRATE AND ACETAMINOPHEN 5; 325 MG/1; MG/1
1 TABLET ORAL
Status: DISCONTINUED | OUTPATIENT
Start: 2017-03-08 | End: 2017-03-10 | Stop reason: HOSPADM

## 2017-03-08 RX ORDER — INSULIN GLARGINE 100 [IU]/ML
12 INJECTION, SOLUTION SUBCUTANEOUS DAILY
Status: DISCONTINUED | OUTPATIENT
Start: 2017-03-08 | End: 2017-03-10 | Stop reason: HOSPADM

## 2017-03-08 RX ADMIN — INSULIN LISPRO 3 UNITS: 100 INJECTION, SOLUTION INTRAVENOUS; SUBCUTANEOUS at 12:16

## 2017-03-08 RX ADMIN — INSULIN GLARGINE 12 UNITS: 100 INJECTION, SOLUTION SUBCUTANEOUS at 15:09

## 2017-03-08 RX ADMIN — INSULIN LISPRO 2 UNITS: 100 INJECTION, SOLUTION INTRAVENOUS; SUBCUTANEOUS at 00:20

## 2017-03-08 RX ADMIN — FAMOTIDINE 20 MG: 20 TABLET ORAL at 22:40

## 2017-03-08 RX ADMIN — Medication 10 ML: at 06:01

## 2017-03-08 RX ADMIN — ATORVASTATIN CALCIUM 40 MG: 40 TABLET, FILM COATED ORAL at 22:39

## 2017-03-08 RX ADMIN — HEPARIN SODIUM 5000 UNITS: 5000 INJECTION, SOLUTION INTRAVENOUS; SUBCUTANEOUS at 09:59

## 2017-03-08 RX ADMIN — FAMOTIDINE 20 MG: 20 TABLET ORAL at 10:01

## 2017-03-08 RX ADMIN — HYDROCODONE BITARTRATE AND ACETAMINOPHEN 1 TABLET: 5; 325 TABLET ORAL at 22:39

## 2017-03-08 RX ADMIN — HEPARIN SODIUM 5000 UNITS: 5000 INJECTION, SOLUTION INTRAVENOUS; SUBCUTANEOUS at 00:21

## 2017-03-08 RX ADMIN — ASPIRIN 325 MG ORAL TABLET 325 MG: 325 PILL ORAL at 10:03

## 2017-03-08 RX ADMIN — INSULIN LISPRO 6 UNITS: 100 INJECTION, SOLUTION INTRAVENOUS; SUBCUTANEOUS at 22:38

## 2017-03-08 RX ADMIN — HYDROCODONE BITARTRATE AND ACETAMINOPHEN 1 TABLET: 5; 325 TABLET ORAL at 13:50

## 2017-03-08 NOTE — ROUTINE PROCESS
Patient: Pino Gilbert Age: 73 y.o. Sex: male     Bedside and Verbal shift change report given to Adalgisa Mosqueda RN (oncoming nurse) by Sharif Hale RN (offgoing nurse). Report included the following information SBAR, Kardex, MAR and Recent Results. PATIENT GOALS FOR TODAY PATIENT PRIORITIES FOR TODAY   Goals are: NIH stroke protocol PT  Updated on Whiteboard in Patients Room: no   Patient states his/her priorities are: to loose weight  Updated on Whiteboard in Patients Room: no     SITUATION:   Code Status: Full Code Reason for Admission: TIA (transient ischemic attack)  CVA (cerebral vascular accident) [facility])   Hospital day: 2 Problem List: Active Problems:    TIA (transient ischemic attack) (3/6/2017)      Acute CVA (cerebrovascular accident) ([facility] [address] (3/6/2017)      CVA (cerebral vascular accident) ([facility] [address] (3/7/2017)      Hyperglycemia (3/7/2017)       Attending Provider:   Amanda Morillo MD Allergies: No Known Allergies   BACKGROUND:   Past Medical History: History reviewed. No pertinent past medical history. ASSESSMENT:   Neuro:  Neurologic State: Alert, Eyes open spontaneously, Orientation Level: Oriented X4 CV:  Patient on Telemetry: Cardiac/Telemetry Monitor On: Yes    Box Number: 91   Cardiac Rhythm: Normal sinus rhythm  Patient has a pace maker:   Endocrine   Recent Glucose Results:   Lab Results   Component Value Date/Time    GLUCPOC 182 (H) 03/07/2017 10:15 PM    GLUCPOC 236 (H) 03/07/2017 05:22 PM    GLUCPOC 220 (H) 03/07/2017 10:38 AM        Respiratory:  O2 Device: Room air       Supplemental O2         Incentive Spirometery          GI  Current diet: DIET REGULAR                                        Reasons if patient has a gonzalez: n/a   Patient Safety  Restraints:    Family notified:    Other Alternatives:     Fall Risk   Total Score: 4   Safety Measures: Bed/Chair alarm on, Bed/Chair-Wheels locked, Bed in low position, Call light within reach, Emergency bedside equipment, Fall prevention (comment), Gripper socks, Family at bedside, Side rails X2, Side rails X 3 VTE Prophylaxis                WOUND (if present)  Wound Type:  n/a  Dressing present Dressing Present : No  Wound Concerns/Notes: n/a IV ACCESS     Reasons if patient has a central line: n/a     PAIN  Pain Assessment  Pain Intensity 1: 0 (03/08/17 0400)  Pain Location 1: Knee     Patient Stated Pain Goal: 0  Time of last intervention: 0400   Reassessment Completed: no Last Vitals:  Vitals:    03/07/17 1600 03/07/17 2006 03/08/17 0000 03/08/17 0400   BP: (!) 168/102 176/82 (!) 187/107 (!) 203/79   Pulse: 85 92 96 76   Resp: 22 20 22 20   Temp: 97.7 °F (36.5 °C) 98.7 °F (37.1 °C) 97.5 °F (36.4 °C) 99 °F (37.2 °C)   SpO2: 99% 98% 99% 100%   Weight:       Height:          Last 3 Weights:  Last 3 Recorded Weights in this Encounter    03/07/17 0130 03/07/17 0351   Weight: 92.5 kg (204 lb) 88.2 kg (194 lb 6.4 oz)     Weight change:   LAB RESULTS  Recent Labs      03/06/17   2245   WBC  16.6*   HGB  17.7*   HCT  49.8*   PLT  257     Recent Labs      03/06/17   2245   NA  137   K  3.8   GLU  204*   BUN  13   CREA  1.27   CA  8.9   INR  1.1      RECOMMENDATIONS AND DISCHARGE PLANNING   1. Discharge plan for patient // Needs or barriers to disharge: ?rehab . 2. Estimated Discharge Date: 3/10/17 Posted on Whiteboard in Patients Room: no    \"HEALS\" SAFETY CHECK   A safety check occurred in the patient's room between off going nurse and oncoming nurse listed above. The safety check included the below items  Area Items   H  High Alert Meds  - Verify all high alert medication drips (heparin, PCA, etc.)   E  Equipment - Suction is set up for ALL patients (with yanker)  - Red plugs utilized for all equipment (IV pumps, etc.)  - WOWs wiped down at end of shift.  - Room stocked with oxygen, suction, and other unit-specific supplies   A  Alarms - Bed alarm is set for fall risk patients  - Ensure chair alarm is in place and activated if patient is up in a chair   L  Lines - Check IV for any infiltration  - Feliz bag is empty if patient has a Feliz   - Tubing and IV bags are labeled   S  Safety   - Room is clean, patient is clean, and equipment is clean. - Ensure room is clear and free of clutter  - Hallways are clear from equipment besides carts.    - Fall bracelet on for fall risk patients  - Suction is set up for ALL patients (with yuliker)  - Isolation precautions followed, supplies available outside room, sign posted   Eugenio Escobedo RN

## 2017-03-08 NOTE — PROGRESS NOTES
Care Management Interventions  Transition of Care Consult (CM Consult): Discharge Planning  Physical Therapy Consult: Yes  Occupational Therapy Consult: Yes  Speech Therapy Consult: Yes  Current Support Network: Lives with Spouse  Confirm Follow Up Transport: Family  Plan discussed with Pt/Family/Caregiver: Yes     CM contacted [facility]'s admission liaison to request screening for possible placement to unit. Pt's sister and spouse have decided on [facility], [facility]. FOC presented and pt signed form and placed in pt's paper lite chart.

## 2017-03-08 NOTE — PROGRESS NOTES
Occupational Therapy Note:  OT session attempted. This patient declining OT at this time and his wife states: \"He just got comfortable\". Will f/u later this afternoon. He is agreeable to this. Jennifer Hyde, OTR/L   Second attempt: he is receiving nursing care. Will f/u as appropriate for this patient.  Paco Rincon, OTR/L

## 2017-03-08 NOTE — PROGRESS NOTES
Patient refused to have labs drawn and refuse to have a new IV started after pulling out the IV that he had. Patient stated \"tell the doctor that I am not giving up anymore of my blood. \" Patient became very agitated and belligerent towards  and staff.     0400 MEWS score 3 due to b/p 203/79. Patient agitated and upset about having to have be woken up for labs and vital signs and not being able to walk to the bathroom. Deny any c/o pain. Resting in bed.    6585 Patient refused to have blood sugar checked. Patient stated \"no one can't draw anymore blood from me, no more needles. \"

## 2017-03-08 NOTE — PROGRESS NOTES
PT attempted tx however pt is currently getting a bath with staff. Will follow up as pt schedule allows. Thank you for this referral. Ruthie Olivares, PT, DPT.

## 2017-03-08 NOTE — CDMP QUERY
Please clarify if this patient is being treated/managed for:    =>Left sided weakness r/t CVA-now resolved  =>Left sided weakness r/t OA  =>Other Explanation of clinical findings  =>Unable to Determine (no explanation of clinical findings)    The medical record reflects the following clinical findings, treatment, and risk factors:  *Pt arrived to the ED by EMS with co Left sided weakness onset today approx 1400. *H&P- Left side weakness - ? New changes Vs old weakness related to OA   *Progress note 3/7:  Acute CVA; 5/5 motor x4    QUESTIONS?    Tyler Harris RN BS CCDS [phone]

## 2017-03-08 NOTE — PROGRESS NOTES
Progress Note    Patient: Heriberto Pearson MRN: [id number]  SSN: [id number]    YOB: 1945  Age: 73 y.o.   Sex: male      Admit Date: 3/6/2017    LOS: 2 days     Subjective:     Patient presents with left sided weakness slight improvement       Current Facility-Administered Medications   Medication Dose Route Frequency    insulin glargine (LANTUS) injection 12 Units  12 Units SubCUTAneous DAILY    [START ON 3/9/2017] aspirin chewable tablet 81 mg  81 mg Oral DAILY    HYDROcodone-acetaminophen (NORCO) 5-325 mg per tablet 1 Tab  1 Tab Oral Q4H PRN    sodium chloride (NS) flush 5-10 mL  5-10 mL IntraVENous Q8H    sodium chloride (NS) flush 5-10 mL  5-10 mL IntraVENous PRN    atorvastatin (LIPITOR) tablet 40 mg  40 mg Oral QHS    acetaminophen (TYLENOL) tablet 650 mg  650 mg Oral Q4H PRN    labetalol (NORMODYNE;TRANDATE) injection 5 mg  5 mg IntraVENous Q10MIN PRN    famotidine (PEPCID) tablet 20 mg  20 mg Oral Q12H    heparin (porcine) injection 5,000 Units  5,000 Units SubCUTAneous Q8H    haloperidol lactate (HALDOL) injection 5 mg  5 mg IntraMUSCular Q8H PRN    labetalol (NORMODYNE;TRANDATE) 20 mg/4 mL (5 mg/mL) injection 5 mg  5 mg IntraVENous Q10MIN PRN    insulin lispro (HUMALOG) injection   SubCUTAneous AC&HS    glucose chewable tablet 16 g  4 Tab Oral PRN    glucagon (GLUCAGEN) injection 1 mg  1 mg IntraMUSCular PRN    dextrose (D50W) injection syrg 12.5-25 g  25-50 mL IntraVENous PRN          Objective:     Vitals:    03/08/17 0000 03/08/17 0400 03/08/17 0756 03/08/17 1200   BP: (!) 187/107 (!) 203/79 (!) 192/109 (!) 158/108   Pulse: 96 76 99 98   Resp: 22 20 19 18   Temp: 97.5 °F (36.4 °C) 99 °F (37.2 °C) 97.9 °F (36.6 °C) 98.6 °F (37 °C)   SpO2: 99% 100% 100% 98%   Weight:       Height:            Intake and Output:  Current Shift:    Last three shifts: 03/06 1901 - 03/08 0700  In: 460 [I.V.:460]  Out: 250 [Urine:250]    Physical Exam:   GENERAL: alert, cooperative, no distress, appears stated age  NEUROLOGIC: negative findings: alert, oriented x3  speech normal in context and clarity  cranial nerves II-XII intact  motor strength: full proximally and distally  no involuntary movements - tremors, positive findings: weakness lle worse lue    Lab/Data Review:  Recent Results (from the past 24 hour(s))   GLUCOSE, POC    Collection Time: 03/07/17  5:22 PM   Result Value Ref Range    Glucose (POC) 236 (H) 70 - 110 mg/dL   GLUCOSE, POC    Collection Time: 03/07/17 10:15 PM   Result Value Ref Range    Glucose (POC) 182 (H) 70 - 110 mg/dL   GLUCOSE, POC    Collection Time: 03/08/17 11:47 AM   Result Value Ref Range    Glucose (POC) 183 (H) 70 - 110 mg/dL   CBC WITH AUTOMATED DIFF    Collection Time: 03/08/17  1:15 PM   Result Value Ref Range    WBC 16.0 (H) 4.6 - 13.2 K/uL    RBC 5.55 (H) 4.70 - 5.50 M/uL    HGB 16.6 (H) 13.0 - 16.0 g/dL    HCT 48.1 (H) 36.0 - 48.0 %    MCV 86.7 74.0 - 97.0 FL    MCH 29.9 24.0 - 34.0 PG    MCHC 34.5 31.0 - 37.0 g/dL    RDW 13.6 11.6 - 14.5 %    PLATELET 419 789 - 188 K/uL    MPV 9.9 9.2 - 11.8 FL    NEUTROPHILS 75 (H) 40 - 73 %    LYMPHOCYTES 15 (L) 21 - 52 %    MONOCYTES 8 3 - 10 %    EOSINOPHILS 2 0 - 5 %    BASOPHILS 0 0 - 2 %    ABS. NEUTROPHILS 12.0 (H) 1.8 - 8.0 K/UL    ABS. LYMPHOCYTES 2.4 0.9 - 3.6 K/UL    ABS. MONOCYTES 1.2 0.05 - 1.2 K/UL    ABS. EOSINOPHILS 0.3 0.0 - 0.4 K/UL    ABS.  BASOPHILS 0.1 (H) 0.0 - 0.06 K/UL    DF AUTOMATED     METABOLIC PANEL, BASIC    Collection Time: 03/08/17  1:15 PM   Result Value Ref Range    Sodium 138 136 - 145 mmol/L    Potassium 4.2 3.5 - 5.5 mmol/L    Chloride 102 100 - 108 mmol/L    CO2 27 21 - 32 mmol/L    Anion gap 9 3.0 - 18 mmol/L    Glucose 199 (H) 74 - 99 mg/dL    BUN 11 7.0 - 18 MG/DL    Creatinine 0.98 0.6 - 1.3 MG/DL    BUN/Creatinine ratio 11 (L) 12 - 20      GFR est AA >60 >60 ml/min/1.73m2    GFR est non-AA >60 >60 ml/min/1.73m2    Calcium 8.9 8.5 - 10.1 MG/DL      FINDINGS:      Approximately 1 cm axial plane lesion in the right posterior lentiform nucleus  with hyperintense and restricted diffusion consistent with an acute infarct. Has  some extension craniad to the corona radiata. No other acute infarcts. There is  grossly patent flow signal in the basilar artery and both internal carotid  arteries. Global cerebral and cerebellar volume loss. There is advanced atrophy  in the right greater than left anterior and medial temporal lobes with  associated ex vacuo enlargement of the temporal horn on the right. Some patchy increased T2 FLAIR signal in the periventricular white matter  consistent with chronic small vessel ischemic changes. No findings of chronic  cortical infarct or mass lesion. No shift of midline structures. No  hydrocephalus. There is no chronic hemorrhage seen on GRE images. Mild mucosal thickening right maxillary sinus. Sinuses are otherwise clear. No suspicious marrow signal in the calvarium. Marrow signal not well seen in the  upper cervical spine. IMPRESSION  IMPRESSION:     Motion degraded exam with some loss of details. Acute infarct in the right posterior lentiform nucleus with extension into the  corona radiata. Global cerebral and cerebellar volume loss. More pronounced volume loss in the  anterior temporal lobes, right is worse than left. More asymmetry than expected  for patients with Alzheimer's dementia. Nonspecific. Periventricular white matter lucencies bilaterally likely chronic small vessel  ischemic changes. No definite mass lesion or chronic infarcts. Findings: Contiguous noncontrast axial images of the brain are obtained from the  foramen magnum to the vertex and reviewed in brain and bone windows. There is some decreased attenuation in the periventricular deep cerebral white  matter, bilaterally.   There are several subtle foci of decreased signal  intensity in the corona radiata, bilaterally there is a small focal defect in  the posterosuperior right lenticular nucleus possible small lacunar infarct, age  indeterminate. There is no mass, mass effect or hemorrhage. The cerebrospinal  fluid spaces are mildly prominent but symmetrical.  No significant extra-axial  abnormalities are seen. The pituitary fossa is unremarkable. There is mild  mucosal thickening right maxillary sinus. There is some mild mucosal thickening  in scattered ethmoid air cells, bilaterally. The remaining paranasal sinuses  are clear. The mastoid air cells are clear. No significant bony abnormalities  are seen. IMPRESSION  IMPRESSION[de-identified]      No definite acute ischemic change or hemorrhage. Possible small lacunar infarct  posterosuperior right lenticular nucleus, age indeterminate. Mild burden  nonspecific deep cerebral white matter lucencies, most likely chronic  microvascular ischemic change. Mild age-related atrophy without other specific  parenchymal abnormality.         Ct imaging personally reviewed     Assessment:   cva  Newly diagnosed diabetes and hypertension      Active Problems:    TIA (transient ischemic attack) (3/6/2017)      Acute CVA (cerebrovascular accident) ([address] (3/6/2017)      CVA (cerebral vascular accident) ([address] (3/7/2017)      Hyperglycemia (3/7/2017)        Plan:     Reviewed findings with patient and wife as well as hospitalist Awaiting mra studies/echo  for further recommendations    Signed By: Arnaldo Tamez MD     March 8, 2017

## 2017-03-08 NOTE — PROGRESS NOTES
[facility] Hospitalist Group  Progress Note    Patient: Ranjan Winn Age: 73 y.o. : 1945 MR#: [id number] SSN: [id number]  Date/Time: 3/8/2017     Subjective:     Pt feeling ok, remains weak on left side. Niece debora at bedside     Assessment/Plan:   1. Acute CVA with hemiparesis- ASA. Statin. MRI +, d/w neuro, will need MRA vs CTA, get echo. 2. L knee pain: sever OA. Start norco and PT/OT. 3. New onset DM: HgbA1c of 9.5%. Start lantus and SSI. 4. HTN: permissive: will cont labetalol PRN. 5. Leucocytosis: will get UA to r/o UTI   D/w pt and niece in detail about current diagnosis and plan care. Pt is in denial but agree with diagnosis  Agreed to participate with PT  D/w neuro, MRA  Repeat labs    Case discussed with:  [x]Patient  [x]Family  []Nursing  []Case Management  DVT Prophylaxis:  []Lovenox  [x]Hep SQ  []SCDs  []Coumadin   []On Heparin gtt    Objective:   VS:   Visit Vitals    BP (!) 158/108 (BP 1 Location: Right arm, BP Patient Position: At rest;Supine)    Pulse 98    Temp 98.6 °F (37 °C)    Resp 18    Ht 5' 9\" (1.753 m)    Wt 88.2 kg (194 lb 6.4 oz)    SpO2 98%    BMI 28.71 kg/m2      Tmax/24hrs: Temp (24hrs), Av.1 °F (36.7 °C), Min:97.4 °F (36.3 °C), Max:99 °F (37.2 °C)      Intake/Output Summary (Last 24 hours) at 17 1309  Last data filed at 17 0549   Gross per 24 hour   Intake                0 ml   Output              100 ml   Net             -100 ml       General:  Awake, alert, NAD. Cardiovascular:  RRR. Pulmonary:  CTA B.  GI:  Soft, NT/ND, NABS. Extremities: mild tenderness in L knee, no redness or erythema. No CT or edema. Neuro: AAOX3, left side 3/5, rest 5/5.     Labs:    Recent Results (from the past 24 hour(s))   HEMOGLOBIN A1C WITH EAG    Collection Time: 17  1:20 PM   Result Value Ref Range    Hemoglobin A1c 9.5 (H) 4.2 - 5.6 %    Est. average glucose 226 mg/dL   GLUCOSE, POC    Collection Time: 17  5:22 PM Result Value Ref Range    Glucose (POC) 236 (H) 70 - 110 mg/dL   GLUCOSE, POC    Collection Time: 03/07/17 10:15 PM   Result Value Ref Range    Glucose (POC) 182 (H) 70 - 110 mg/dL   GLUCOSE, POC    Collection Time: 03/08/17 11:47 AM   Result Value Ref Range    Glucose (POC) 183 (H) 70 - 110 mg/dL       Signed By: Karen Yung MD     March 8, 2017

## 2017-03-08 NOTE — PROGRESS NOTES
Problem: Mobility Impaired (Adult and Pediatric)  Goal: *Acute Goals and Plan of Care (Insert Text)  Physical Therapy Goals  Initiated 3/7/2017 and to be accomplished within 7 day(s)  1. Patient will move from supine to sit and sit to supine , scoot up and down and roll side to side in bed with contact guard assist.   2. Patient will transfer from bed to chair and chair to bed with minimal assistance/contact guard assist using the least restrictive device. 3. Patient will perform sit to stand with minimal assistance. 4. Patient will ambulate with minimal assistance/contact guard assist for 50 feet with the least restrictive device. 5. Patient will tolerate sitting up in chair for 3-4hr/day in order to improve respiratory capacity and maintain strength for carryover into functional tasks. 6. Patient will perform HEP with minimal assistance in order to improve strength for carryover into functional tasks. PHYSICAL THERAPY TREATMENT     Patient: Marissa Vidales (76 y.o. male)  Date: 3/8/2017  Diagnosis: TIA (transient ischemic attack)  [facility] <principal problem not specified>       Precautions:  Fall  Chart, physical therapy assessment, plan of care and goals were reviewed. ASSESSMENT:  Pt presents today alert and agreeable to therapy stating, \"We'll see what I can do. What do you want me to do. \" Pt performed long sit in bed independently and laid back down stating, \"What do you want me to do? \" PT instructed pt to bring legs off edge of bed. Pt was able to bring LE's off bed and therapist assisted patient with scooting using ModA and draw sheet. At this point pt became very agitated, reaching for foot of bed in attempts to balance himself. Pt became agitated and attempted to lay back in bed; pt instructed not to lay straight back in bed as the bed rail is directly behind him.  Pt even more agitated and pulling at his condom catheter, cursing, demanding PT take condom catheter off. PT educated pt that it is not within my scope of practice but that I would let nurse know that it's bothering him. PT attempted to assist pt back in bed when pt states, \"If you touch me again, I'll punch you in the face\" with his fist raised in the air. At this time JOEY Mendez entered room and assisted PT to get pt back into bed. Pt left resting with call bell by his side and bed alarm activated. Progression toward goals:  [ ]      Improving appropriately and progressing toward goals  [ ]      Improving slowly and progressing toward goals  [ ]      Not making progress toward goals and plan of care will be adjusted       PLAN:  Patient continues to benefit from skilled intervention to address the above impairments. Continue treatment per established plan of care. Discharge Recommendations:  [facility]  Further Equipment Recommendations for Discharge:  N/A       G-CODES:      Mobility  Current  CJ= 20-39%   Goal  CI= 1-19%. The severity rating is based on the Level of Assistance required for Functional Mobility and ADLs. SUBJECTIVE:   With fist raised in the air, patient stated If you touch me again I'll punch you in the face.       OBJECTIVE DATA SUMMARY:   Critical Behavior:  Neurologic State: Alert, Eyes open spontaneously  Orientation Level: Oriented X4  Cognition: Follows commands     Functional Mobility Training:  Bed Mobility:   Supine to Sit: Minimum assistance  Sit to Supine: Minimum assistance  Scooting: Maximum assistance;Assist x2  Balance:  Sitting: Impaired; With support  Sitting - Static: Fair (occasional)  Sitting - Dynamic: Poor (constant support)  Ambulation/Gait Training:   Pt did not tolerate    Pain:  Pt did not rate pain  Activity Tolerance:   Pt demonstrates decreased tolerance to therapy as pt became aggressive and agitated. Session terminated at this point as pt refusing further mobility and persevverating on taking off condom catheter.  Bed alarm activated. Please refer to the flowsheet for vital signs taken during this treatment.   After treatment:   [ ] Patient left in no apparent distress sitting up in chair  [X] Patient left in no apparent distress in bed  [X] Call bell left within reach  [X] Nursing notified  [ ] Caregiver present  [X] Bed alarm activated      Martha Hyatt, PT   Time Calculation: 12 mins

## 2017-03-08 NOTE — DIABETES MGMT
Glycemic Control Plan of Care    Assessment/Recommendations:  Patient is 73year old with no past medical history was admitted on 03/06/2017 with left sided weakness. No prior history of diabetes mellitus but admitted with A1C of 9.5% (03/07/2017). Noted:  Acute CVA. New onset diabetes mellitus. POC BG range on 03/07/2017: 182-241 mg/dL. POC BG report on 03/08/2017 at time of review: None and noted nursing report that patient refused fingerstick BG check this morning. Explained the importance of BG monitoring to patient and he agreed. I notified the assigned nurse, Arizona Alpers, RN. Also noted patient currently on regular diet but refusing to eat because he would like to shed some weight. Educated patient about the importance of nutrition and encouraged to eat at least 50% of meals. His niece visiting also encouraged patient to eat. Recommendation: Modified correctional lispro insulin to very resistant dose. Continue monitoring and consider adding basal insulin if patient require > 10 units of correctional insulin within 24 hour period. Most recent blood glucose values:    Results for Khoi King (MRN [id number]) as of 3/8/2017 11:37   Ref. Range 3/7/2017 08:22 3/7/2017 10:38 3/7/2017 17:22 3/7/2017 22:15   GLUCOSE,FAST - POC Latest Ref Range: 70 - 110 mg/dL 241 (H) 220 (H) 236 (H) 182 (H)     Current A1C: 9.5% (03/07/2017) is equivalent to average blood glucose of 226 mg/dL during the past 2-3 months. Current hospital diabetes medications:  Correctional lispro insulin ACHS. Home diabetes medications: None. No prior history of diabetes mellitus. Diet: Regular.     Goals:  Blood glucose will be within target range of  mg/dL by 03/11/2017     Education:  __X_  Refer to Diabetes Education Record: noted initiated by RD on 03/07/2017 and patient requested f/u on 03/09/2017.             ___  Education not indicated at this time    Sneha Dallas RN

## 2017-03-09 ENCOUNTER — APPOINTMENT (OUTPATIENT)
Dept: MRI IMAGING | Age: 72
DRG: 065 | End: 2017-03-09
Attending: HOSPITALIST
Payer: COMMERCIAL

## 2017-03-09 LAB
BASOPHILS # BLD AUTO: 0.1 K/UL (ref 0–0.1)
BASOPHILS # BLD: 1 % (ref 0–2)
CHOLEST SERPL-MCNC: 152 MG/DL
DIFFERENTIAL METHOD BLD: ABNORMAL
EOSINOPHIL # BLD: 0.4 K/UL (ref 0–0.4)
EOSINOPHIL NFR BLD: 3 % (ref 0–5)
ERYTHROCYTE [DISTWIDTH] IN BLOOD BY AUTOMATED COUNT: 14 % (ref 11.6–14.5)
GLUCOSE BLD STRIP.AUTO-MCNC: 158 MG/DL (ref 70–110)
GLUCOSE BLD STRIP.AUTO-MCNC: 170 MG/DL (ref 70–110)
GLUCOSE BLD STRIP.AUTO-MCNC: 194 MG/DL (ref 70–110)
HCT VFR BLD AUTO: 46.8 % (ref 36–48)
HDLC SERPL-MCNC: 49 MG/DL (ref 40–60)
HDLC SERPL: 3.1 {RATIO} (ref 0–5)
HGB BLD-MCNC: 16.1 G/DL (ref 13–16)
LDLC SERPL CALC-MCNC: 76.8 MG/DL (ref 0–100)
LIPID PROFILE,FLP: NORMAL
LYMPHOCYTES # BLD AUTO: 16 % (ref 21–52)
LYMPHOCYTES # BLD: 1.9 K/UL (ref 0.9–3.6)
MCH RBC QN AUTO: 30.1 PG (ref 24–34)
MCHC RBC AUTO-ENTMCNC: 34.4 G/DL (ref 31–37)
MCV RBC AUTO: 87.6 FL (ref 74–97)
MONOCYTES # BLD: 1 K/UL (ref 0.05–1.2)
MONOCYTES NFR BLD AUTO: 8 % (ref 3–10)
NEUTS SEG # BLD: 8.5 K/UL (ref 1.8–8)
NEUTS SEG NFR BLD AUTO: 72 % (ref 40–73)
PLATELET # BLD AUTO: 284 K/UL (ref 135–420)
PMV BLD AUTO: 10.2 FL (ref 9.2–11.8)
RBC # BLD AUTO: 5.34 M/UL (ref 4.7–5.5)
TRIGL SERPL-MCNC: 131 MG/DL (ref ?–150)
TSH SERPL DL<=0.05 MIU/L-ACNC: 1.52 UIU/ML (ref 0.36–3.74)
VLDLC SERPL CALC-MCNC: 26.2 MG/DL
WBC # BLD AUTO: 11.8 K/UL (ref 4.6–13.2)

## 2017-03-09 PROCEDURE — 70544 MR ANGIOGRAPHY HEAD W/O DYE: CPT

## 2017-03-09 PROCEDURE — 74011636637 HC RX REV CODE- 636/637: Performed by: HOSPITALIST

## 2017-03-09 PROCEDURE — 97530 THERAPEUTIC ACTIVITIES: CPT

## 2017-03-09 PROCEDURE — 85025 COMPLETE CBC W/AUTO DIFF WBC: CPT | Performed by: HOSPITALIST

## 2017-03-09 PROCEDURE — 36415 COLL VENOUS BLD VENIPUNCTURE: CPT | Performed by: HOSPITALIST

## 2017-03-09 PROCEDURE — 65660000000 HC RM CCU STEPDOWN

## 2017-03-09 PROCEDURE — 84443 ASSAY THYROID STIM HORMONE: CPT | Performed by: FAMILY MEDICINE

## 2017-03-09 PROCEDURE — 70547 MR ANGIOGRAPHY NECK W/O DYE: CPT

## 2017-03-09 PROCEDURE — 74011250636 HC RX REV CODE- 250/636: Performed by: INTERNAL MEDICINE

## 2017-03-09 PROCEDURE — 82962 GLUCOSE BLOOD TEST: CPT

## 2017-03-09 PROCEDURE — 80061 LIPID PANEL: CPT | Performed by: FAMILY MEDICINE

## 2017-03-09 PROCEDURE — 74011250637 HC RX REV CODE- 250/637: Performed by: HOSPITALIST

## 2017-03-09 PROCEDURE — 74011250637 HC RX REV CODE- 250/637: Performed by: INTERNAL MEDICINE

## 2017-03-09 RX ADMIN — HYDROCODONE BITARTRATE AND ACETAMINOPHEN 1 TABLET: 5; 325 TABLET ORAL at 06:48

## 2017-03-09 RX ADMIN — FAMOTIDINE 20 MG: 20 TABLET ORAL at 22:16

## 2017-03-09 RX ADMIN — INSULIN GLARGINE 12 UNITS: 100 INJECTION, SOLUTION SUBCUTANEOUS at 09:36

## 2017-03-09 RX ADMIN — HYDROCODONE BITARTRATE AND ACETAMINOPHEN 1 TABLET: 5; 325 TABLET ORAL at 14:57

## 2017-03-09 RX ADMIN — FAMOTIDINE 20 MG: 20 TABLET ORAL at 09:37

## 2017-03-09 RX ADMIN — ASPIRIN 81 MG CHEWABLE TABLET 81 MG: 81 TABLET CHEWABLE at 09:37

## 2017-03-09 RX ADMIN — HEPARIN SODIUM 5000 UNITS: 5000 INJECTION, SOLUTION INTRAVENOUS; SUBCUTANEOUS at 17:20

## 2017-03-09 RX ADMIN — INSULIN LISPRO 3 UNITS: 100 INJECTION, SOLUTION INTRAVENOUS; SUBCUTANEOUS at 13:07

## 2017-03-09 RX ADMIN — ATORVASTATIN CALCIUM 40 MG: 40 TABLET, FILM COATED ORAL at 22:16

## 2017-03-09 RX ADMIN — INSULIN LISPRO 3 UNITS: 100 INJECTION, SOLUTION INTRAVENOUS; SUBCUTANEOUS at 09:36

## 2017-03-09 RX ADMIN — HEPARIN SODIUM 5000 UNITS: 5000 INJECTION, SOLUTION INTRAVENOUS; SUBCUTANEOUS at 01:14

## 2017-03-09 RX ADMIN — Medication 10 ML: at 22:44

## 2017-03-09 RX ADMIN — HYDROCODONE BITARTRATE AND ACETAMINOPHEN 1 TABLET: 5; 325 TABLET ORAL at 22:16

## 2017-03-09 RX ADMIN — INSULIN LISPRO 3 UNITS: 100 INJECTION, SOLUTION INTRAVENOUS; SUBCUTANEOUS at 17:18

## 2017-03-09 NOTE — PROGRESS NOTES
Problem: Self Care Deficits Care Plan (Adult)  Goal: *Acute Goals and Plan of Care (Insert Text)  Occupational Therapy Goals  Initiated 3/7/2017 within 7 day(s). 1. Patient will perform lower body dressing with modified independence   2. Patient will perform upper body dressing with modified independence. 3. Patient will perform grooming with modified independence progressing to standing at the sink  4. Patient will perform toilet transfers with minimal assistance/contact guard assist.   OCCUPATIONAL THERAPY TREATMENT/DISCHARGE     Patient: Katharina Woodward (89 y.o. male)  Date: 3/9/2017  Diagnosis: TIA (transient ischemic attack)  CVA (cerebral vascular accident) ([facility] [address] <principal problem not specified>  Precautions:  fall  Chart, occupational therapy assessment, plan of care, and goals were reviewed. ASSESSMENT: this patient is angry and minimally participatory as he was upon initial evaluation. He reports he just wants to go home and stay in bed. He has illogical thinking about his condition and is not receptive to therapeutic intervention at all. He was seen in conjunction with PT for safety reasons. When directly requested not to yell as we were attempting to mobilize him. He declined and became angier. He is not receptive to therapeutic intervention at this time. He is c/o right knee pain and he is using this as one of his reasons for not standing. Progression toward goals:  [ ]          Improving appropriately and progressing toward goals  [ ]          Improving slowly and progressing toward goals  [X]          Not making progress toward goals and plan of care will be adjusted       PLAN:  Patient will be discharged from occupational therapy at this time.   Rationale for discharge:  [ ] Goals Achieved  [ ] Alberteen Rubinstein  [X] Patient not participating in therapy  [ ] Other:  Discharge Recommendations:  Home vs. SNF depending on his family's ability to assist him  Further Equipment Recommendations for Discharge:  N/A         G-CODES:      Self Care  Current  CM= 80-99%   D/C  CM= 80-99%. The severity rating is based on the Level of Assistance required for Functional Mobility and ADLs. SUBJECTIVE:   Patient stated Just put me in a wheel chair.  and \"I'm going home so I can lose 40 pounds and then I can walk\"      OBJECTIVE DATA SUMMARY:   Cognitive/Behavioral Status:  Neurologic State: Alert  Orientation Level: Oriented X 3 (not to situation)  This patient is angry and is unable to be redirected  Functional Mobility and Transfers for ADLs:              Bed Mobility:   varies depending on his level of participation; generally min assist although this is following max discussion and encouragement              Transfers:   mod to max assist X 2 to stand   Balance:   mod to max assist standing for a few seconds prior to him flopping back in bed  ADL Intervention:   he is unable/unwilling to tolerate   Therapeutic Exercises:   He is unable/unwilling to tolerate any therapeutic activity  Pain:  He is unable to rate his knee pain numerically, screams in pain throughout entire session; unclear as to whether it hurts more prior to or following session  Activity Tolerance:    No SOB noted; he is limited secondary to his anger and frustration as well as his pain and lack of understanding of therapeutic approaches to his pain management  Please refer to the flowsheet for vital signs taken during this treatment.   After treatment:   [ ]  Patient left in no apparent distress sitting up in chair  [X]  Patient left in no apparent distress in bed  [X]  Call bell left within reach  [ ]  Nursing notified  [X]  Family friend is present  [ ]  Bed alarm activated     MARC Hercules/L  Time Calculation: 17 mins

## 2017-03-09 NOTE — DIABETES MGMT
Diabetes Patient/Family Education Record    Factors That  May Influence Patients Ability  to Learn or  Comply With  Recommendations:    []   Language barrier    []   Cultural needs   [x]   Motivation    []   Cognitive limitation    []   Physical   [x]   Education    []   Physiological factors   []   Hearing/vision/speaking impairment   []   Jew beliefs    [x]   Financial factors   []  Other:   []  No factors identified at this time. Person Instructed:   [x]   Patient   [x]   Family   []  Other     Preference for Learning:   [x]   Verbal   [x]   Written   [x]  Demonstration     Level of Comprehension & Competence:    []  Good                                      [x] Fair                                     [x]  Poor                             [x]  Needs Reinforcement   [x]  Teachback completed    Education Component:   [x]  Medication management, including how to administer insulin (if appropriate) and potential medication interactions provided pearl with packet on how to administer insulin. She denied education for pt stating he will not take it at home and is hoping to go home on a DM pill.      [x]  Nutritional management provided pt with [employer] going home with diabetes book discussing nutrition information as well as general DM info   [x]  Exercise   [x]  Signs, symptoms, and treatment of hyperglycemia and hypoglycemia   [x]  Prevention, recognition and treatment of hyperglycemia and hypoglycemia   [x]  Importance of blood glucose monitoring and how to obtain a blood glucose meter    [x]  Instruction on use of blood glucose meter   []  Discuss the importance of HbA1C monitoring and provide patient with  results   [x]  Sick day guidelines   [x]  Proper use and disposal of lancets, needles, syringes or insulin pens (if appropriate)   [x]  Potential long-term complications (retinopathy, kidney disease, neuropathy, heart disease, stroke, vascular disease, foot care)   [x] Provide emergency contact number and contact number for more information    [x]  Goal:  Patient/family will demonstrate understanding of Diabetes Self Management Skills by: (date) _3/16/17______  Plan for post-discharge education or self-management support:    [x] Outpatient class schedule provided in d/c instructions           [] Patient Declined    [] Scheduled for outpatient classes (date) _______       Cecilia Shepard MS, RD, CDE  Pager: [phone]

## 2017-03-09 NOTE — ROUTINE PROCESS
ARU/IPR REFERRAL CONTACT NOTE  [address] [place] [address] for Physical Rehabilitation    RE: Tori Tse     Thank you for the opportunity to review this patient's case for admission to [address] [place] [address] for Physical Rehabilitation. Based on our pre-admission screening:     [[x ] This patient does not meet criteria for admission to [facility] for Physical  Rehabilitation due to:    [x ] Too low level, per documentation, patient has not demonstrated tolerance for acute rehabilitation level of intensity. Pt is also agitated, combative and refusing care. [ x] We recommend the following:  [ ] Re-evaluation of this patient's status when appropriate  [ ] Home with Home Care Services  [ ] Outpatient Therapy Services  [x ] Skilled Nursing Facility/Sub Acute Services with extended stay option  [ ] Assisted Living/ Adult Home    Again, Thank you for this referral. Should you have any questions please do not hesitate to call. Sincerely,  Samir Stern. Shima Price, [address]  Shima Price, RN  Admissions [facility] for Physical Rehabilitation  [phone]

## 2017-03-09 NOTE — PROGRESS NOTES
conducted a Spiritual Assessment for Lora Xiao, who is a 73 y.o.,male. Patients Primary Language is: [place]. According to the patients EMR Church Affiliation is: No Jehovah's witness. When I asked to enter the room the patient was resting with his eyes closed. The TV was off and the shades were partially closed. The visitor's chair had a sheet and a blanket on it, a sign that visitors spent the night. His breakfast tray was beside his bed untouched. There was also an very strong odor in the room. Patient appeared very tired but wanted to see a . Patient was very aware of his surroundings. I sensed patient to be curious about his prognosis because he mentioned that has been a couple of exams and he does not know the results yet. He is expecting to see a doctor.  suggested to talk to his nurse during [facility]. Patient's wife spend the night in the hospital and went to work this morning. Patient shared how he appreciates his wife care. The  provided the following Interventions:  Initiated a relationship of care and support. Patient asked for continued daily prayers. Chart reviewed. The following outcomes where achieved:   confirmed Patient's Church Affiliation, Minh Levine although he has not been in a Alevism for a while. He does not have any spiritual support. Patient shared limited information about both their medical narrative and spiritual journey/beliefs; and his beliefs will not hinder his medical treatment. Patient processed feeling about current hospitalization. Patient feels that he is receiving a good care. Patient expressed gratitude for 's visit. Assessment:   gently investigated why patient seems to be irritable towards hospitalization and staff. Patient feels that he has felt too much pain in his life time and he is concerned that poking, moving, tests will hurt him more.  also asked about end of life.  Last encounter patient said out loud: \"I don't care if I die. \" Patient shared that he is not afraid of death and he knows that dying is part of life. He also mentioned that he talked and apologized to his wife. The night he entered the hospital he was mad at everyone and he did not think how saying this would hurt his wife. Plan:  Chaplains will continue to follow and will provide pastoral care as needed.     [address] [facility] Max Flores  [phone]

## 2017-03-09 NOTE — ROUTINE PROCESS
Patient: Lisbeth No Age: 73 y.o. Sex: male     Bedside and Verbal shift change report given to Joann Lyles RN (oncoming nurse) by Marylene Shaker, RN (offgoing nurse). Report included the following information SBAR, Kardex, MAR and Recent Results. PATIENT GOALS FOR TODAY PATIENT PRIORITIES FOR TODAY   Goals are: NIH stroke protocol PT  Updated on Whiteboard in Patients Room: no   Patient states his/her priorities are: to loose weight  Updated on Whiteboard in Patients Room: no     SITUATION:   Code Status: Full Code Reason for Admission: TIA (transient ischemic attack)  CVA (cerebral vascular accident) [facility])   Hospital day: 3 Problem List: Active Problems:    TIA (transient ischemic attack) (3/6/2017)      Acute CVA (cerebrovascular accident) ([facility] [address] (3/6/2017)      CVA (cerebral vascular accident) ([facility] [address] (3/7/2017)      Hyperglycemia (3/7/2017)       Attending Provider:   Adamaris Brooks MD Allergies: No Known Allergies   BACKGROUND:   Past Medical History: History reviewed. No pertinent past medical history. ASSESSMENT:   Neuro:  Neurologic State: Alert, Agitated, Eyes open spontaneously, Irritable, Orientation Level: Oriented X4 CV:  Patient on Telemetry: Cardiac/Telemetry Monitor On: Yes    Box Number: [phone]   Cardiac Rhythm: Normal sinus rhythm  Patient has a pace maker:   Endocrine   Recent Glucose Results:   Lab Results   Component Value Date/Time     (H) 03/08/2017 01:15 PM    GLUCPOC 200 (H) 03/08/2017 09:46 PM    GLUCPOC 145 (H) 03/08/2017 04:34 PM    GLUCPOC 183 (H) 03/08/2017 11:47 AM        Respiratory:  O2 Device: Room air       Supplemental O2         Incentive Spirometery          GI  Current diet: DIET CARDIAC Regular; 2 GM NA (House Low NA); Consistent Carb 1800kcal                                        Reasons if patient has a gonzalez: n/a   Patient Safety  Restraints:    Family notified:    Other Alternatives:     Fall Risk   Total Score: 4   Safety Measures: Bed/Chair alarm on, Bed/Chair-Wheels locked, Bed in low position, Call light within reach, Emergency bedside equipment, Fall prevention (comment), Gripper socks, Side rails X2, Side rails X 3 VTE Prophylaxis                WOUND (if present)  Wound Type:  n/a  Dressing present Dressing Present : No  Wound Concerns/Notes: n/a IV ACCESS     Reasons if patient has a central line: n/a     PAIN  Pain Assessment  Pain Intensity 1: 0 (03/09/17 0416)  Pain Location 1: Knee  Pain Intervention(s) 1: Medication (see MAR)  Patient Stated Pain Goal: 0  Time of last intervention: 0400   Reassessment Completed: no Last Vitals:  Vitals:    03/08/17 1548 03/08/17 2000 03/09/17 0003 03/09/17 0424   BP: (!) 174/92 (!) 189/99 (!) 198/122 (!) 168/93   Pulse: 97 96 97 90   Resp: 18 16 18 17   Temp: 98.2 °F (36.8 °C) 98.6 °F (37 °C) 99.1 °F (37.3 °C) 98.4 °F (36.9 °C)   SpO2: 93% 97% 97% 96%   Weight:       Height:          Last 3 Weights:  Last 3 Recorded Weights in this Encounter    03/07/17 0130 03/07/17 0351   Weight: 92.5 kg (204 lb) 88.2 kg (194 lb 6.4 oz)     Weight change:   LAB RESULTS  Recent Labs      03/08/17   1315  03/06/17   2245   WBC  16.0*  16.6*   HGB  16.6*  17.7*   HCT  48.1*  49.8*   PLT  262  257     Recent Labs      03/08/17   1315  03/06/17   2245   NA  138  137   K  4.2  3.8   GLU  199*  204*   BUN  11  13   CREA  0.98  1.27   CA  8.9  8.9   INR   --   1.1      RECOMMENDATIONS AND DISCHARGE PLANNING   1. Discharge plan for patient // Needs or barriers to disharge: ?rehab . 2. Estimated Discharge Date: 3/10/17 Posted on Whiteboard in Patients Room: no    \"HEALS\" SAFETY CHECK   A safety check occurred in the patient's room between off going nurse and oncoming nurse listed above. The safety check included the below items  Area Items   H  High Alert Meds  - Verify all high alert medication drips (heparin, PCA, etc.)   E  Equipment - Suction is set up for ALL patients (with ry)  - Red plugs utilized for all equipment (IV pumps, etc.)  - WOWs wiped down at end of shift.  - Room stocked with oxygen, suction, and other unit-specific supplies   A  Alarms - Bed alarm is set for fall risk patients  - Ensure chair alarm is in place and activated if patient is up in a chair   L  Lines - Check IV for any infiltration  - Feliz bag is empty if patient has a Feliz   - Tubing and IV bags are labeled   S  Safety   - Room is clean, patient is clean, and equipment is clean. - Ensure room is clear and free of clutter  - Hallways are clear from equipment besides carts.    - Fall bracelet on for fall risk patients  - Suction is set up for ALL patients (with ry)  - Isolation precautions followed, supplies available outside room, sign posted   Edy Reyes RN

## 2017-03-09 NOTE — PROGRESS NOTES
Problem: Mobility Impaired (Adult and Pediatric)  Goal: *Acute Goals and Plan of Care (Insert Text)  Physical Therapy Goals  Initiated 3/7/2017 and to be accomplished within 7 day(s)  1. Patient will move from supine to sit and sit to supine , scoot up and down and roll side to side in bed with contact guard assist.   2. Patient will transfer from bed to chair and chair to bed with minimal assistance/contact guard assist using the least restrictive device. 3. Patient will perform sit to stand with minimal assistance. 4. Patient will ambulate with minimal assistance/contact guard assist for 50 feet with the least restrictive device. 5. Patient will tolerate sitting up in chair for 3-4hr/day in order to improve respiratory capacity and maintain strength for carryover into functional tasks. 6. Patient will perform HEP with minimal assistance in order to improve strength for carryover into functional tasks. Outcome: Not Progressing Towards Goal  PHYSICAL THERAPY TREATMENT/DISCHARGE     Patient: Heriberto Pearson (7 y.o. male)  Date: 3/9/2017  Diagnosis: TIA (transient ischemic attack)  CVA (cerebral vascular accident) ([facility] [address] <principal problem not specified>       Precautions:   Fall  Chart, physical therapy assessment, plan of care and goals were reviewed. ASSESSMENT:  Pt presents today and is agreeable to sit at EOB with PT/OT who are co-treating for safety concerns with this agitated patient. Pt would not allow therapist to assist his LLE but would allow family member in room to assist with LLE to sit EOB. Pt required increased time to sit at EOB and was at [facility] assist level. Pt became agitated when therapist would not \"pull up\" on patient's arms to assist; pt states, \"You never do what I need. You're useless. \" Attempted to educate pt on risk to both patient and therapist by pulling on arms and an alternative way to assist with bed mobility, however pt refusing.  Pt sitting EOB states \"Get me in a wheelchair. I'm going home. I'm leaving and never coming back\" and pt became angry and began shouting when therapists explained we were not leaving the hospital. Reasoned with patient to stand for short time to change soiled ivanna padding underneath; upon 1st attempt pt screaming \"It hurts. \" Explained to pt he needed only to lift buttocks off bed to get ivanna pad from under him to which pt agreed and family member assisted to change padding. Pt further agitated and yelling, \"I'm getting back into bed. \" Pt would not allow therapists to assist him back into bed. As pt continues to demonstrate poor tolerance to therapy, is not receptive to proper and safe therapy techniques, will sign off. Pt is agitated and further mobility is not skilled or therapeutic as pt not receptive. Progression toward goals:  [ ]      Goals met  [ ]      Improving appropriately and progressing toward goals  [ ]      Improving slowly and progressing toward goals  [X]      Not making progress toward goals and plan of care will be adjusted       PLAN:  Patient will be discharged from physical therapy at this time. Rationale for discharge:  [ ] Goals Achieved  [ ] Sana Channel  [X] Patient not participating in therapy  [ ] Other:  Discharge Recommendations:  [facility] 24/7 versus SNF depending on family's ability to assist patien  Further Equipment Recommendations for Discharge:  N/A       G-CODES:   Mobility  Current  CM= 80-99%   Goal  CJ= 20-39%  D/C  CM= 80-99%. The severity rating is based on the Level of Assistance required for Functional Mobility and ADLs. SUBJECTIVE:   Patient stated I'm done. I'm not doing this anymore.       OBJECTIVE DATA SUMMARY:   Critical Behavior:  Neurologic State: Alert  Orientation Level: Oriented X4  Functional Mobility Training:  Bed Mobility:   Supine to Sit: Stand-by asssistance; Additional time  Sit to Supine: Stand-by asssistance; Additional time  Scooting: Stand-by asssistance; Additional time   Transfers:  Sit to Stand: Moderate assistance;Assist x2  Stand to Sit: Moderate assistance;Assist x2       X2 attempts as pt did not stand long enough to remove soiled ivanna pad from bedding  Balance:  Sitting: Impaired; With support  Sitting - Static: Fair (occasional)  Sitting - Dynamic: Fair (occasional)  Standing: Impaired; With support  Standing - Static: Poor  Standing - Dynamic : Poor  Ambulation/Gait Training:   Pt will not tolerate and states he \"can't walk because my knee hurts. \"   Pain:  Pt reports 7/10 pain or discomfort prior to treatment.  (L knee pain-from OA)  Pt reports 7/10 pain or discomfort post treatment. Activity Tolerance:   Pt continues to demonstrate decreased tolerance to activity and has been minimally participatory in therapy. Pt continues to yell during therapy and is not receptive to skilled care despite maximum encouragement. Pt demonstrates poor reasoning and is not an appropriate candidate for therapy. Please refer to the flowsheet for vital signs taken during this treatment.   After treatment:   [ ] Patient left in no apparent distress sitting up in chair  [X] Patient left in no apparent distress in bed  [X] Call bell left within reach  [ ] Nursing notified  [X] Caregiver present  [X] Bed alarm activated  Nery Vazquez PT   Time Calculation: 10 mins

## 2017-03-09 NOTE — PROGRESS NOTES
[facility] Hospitalist Group  Progress Note    Patient: Kate Adkins Age: 73 y.o. : 1945 MR#: [id number] SSN: [id number]  Date/Time: 3/9/2017     Subjective:     Pt feeling ok, remains weak on left side. Slight better in left UE  Pt refused labs, echo, PT  and all tests. D/w pt and family at bedside and convinced to get tests done. Pt agreed now  Niece debora at bedside, per her he is having cognitive problems at home and he is slight worse now. Assessment/Plan:   1. Acute CVA with hemiparesis- ASA, Statin. MRI +, MRA and echo pending. 2. L knee pain: sever OA. cont norco and PT/OT. 3. New onset DM: HgbA1c of 9.5%. cont lantus and SSI. 4. HTN: permissive: will cont labetalol PRN. 5. Leucocytosis: better, no UTI   D/w pt and niece in detail about current diagnosis and plan care. i convinced pt to get tests done. Pt agreed now  Agreed to participate with PT  Repeat labs  D/w RN and CM    Case discussed with:  [x]Patient  [x]Family  []Nursing  []Case Management  DVT Prophylaxis:  []Lovenox  [x]Hep SQ  []SCDs  []Coumadin   []On Heparin gtt    Objective:   VS:   Visit Vitals    BP (!) 165/91 (BP 1 Location: Right arm, BP Patient Position: At rest;Supine)    Pulse 92    Temp 97.9 °F (36.6 °C)    Resp 17    Ht 5' 9\" (1.753 m)    Wt 88.2 kg (194 lb 6.4 oz)    SpO2 97%    BMI 28.71 kg/m2      Tmax/24hrs: Temp (24hrs), Av.4 °F (36.9 °C), Min:97.9 °F (36.6 °C), Max:99.1 °F (37.3 °C)      Intake/Output Summary (Last 24 hours) at 17 1232  Last data filed at 17 1205   Gross per 24 hour   Intake              120 ml   Output              400 ml   Net             -280 ml       General:  Awake, alert, NAD. Cardiovascular:  RRR. Pulmonary:  CTA B.  GI:  Soft, NT/ND, NABS. Extremities: mild tenderness and swelling in L knee, no redness or erythema. Neuro: AAOX3, left UE 4/5, LLE 2/5, rest 5/5.     Labs:    Recent Results (from the past 24 hour(s))   CBC WITH AUTOMATED DIFF    Collection Time: 03/08/17  1:15 PM   Result Value Ref Range    WBC 16.0 (H) 4.6 - 13.2 K/uL    RBC 5.55 (H) 4.70 - 5.50 M/uL    HGB 16.6 (H) 13.0 - 16.0 g/dL    HCT 48.1 (H) 36.0 - 48.0 %    MCV 86.7 74.0 - 97.0 FL    MCH 29.9 24.0 - 34.0 PG    MCHC 34.5 31.0 - 37.0 g/dL    RDW 13.6 11.6 - 14.5 %    PLATELET 225 096 - 022 K/uL    MPV 9.9 9.2 - 11.8 FL    NEUTROPHILS 75 (H) 40 - 73 %    LYMPHOCYTES 15 (L) 21 - 52 %    MONOCYTES 8 3 - 10 %    EOSINOPHILS 2 0 - 5 %    BASOPHILS 0 0 - 2 %    ABS. NEUTROPHILS 12.0 (H) 1.8 - 8.0 K/UL    ABS. LYMPHOCYTES 2.4 0.9 - 3.6 K/UL    ABS. MONOCYTES 1.2 0.05 - 1.2 K/UL    ABS. EOSINOPHILS 0.3 0.0 - 0.4 K/UL    ABS.  BASOPHILS 0.1 (H) 0.0 - 0.06 K/UL    DF AUTOMATED     METABOLIC PANEL, BASIC    Collection Time: 03/08/17  1:15 PM   Result Value Ref Range    Sodium 138 136 - 145 mmol/L    Potassium 4.2 3.5 - 5.5 mmol/L    Chloride 102 100 - 108 mmol/L    CO2 27 21 - 32 mmol/L    Anion gap 9 3.0 - 18 mmol/L    Glucose 199 (H) 74 - 99 mg/dL    BUN 11 7.0 - 18 MG/DL    Creatinine 0.98 0.6 - 1.3 MG/DL    BUN/Creatinine ratio 11 (L) 12 - 20      GFR est AA >60 >60 ml/min/1.73m2    GFR est non-AA >60 >60 ml/min/1.73m2    Calcium 8.9 8.5 - 10.1 MG/DL   URINALYSIS W/ RFLX MICROSCOPIC    Collection Time: 03/08/17  3:15 PM   Result Value Ref Range    Color YELLOW      Appearance CLEAR      Specific gravity 1.023 1.005 - 1.030      pH (UA) 5.0 5.0 - 8.0      Protein NEGATIVE  NEG mg/dL    Glucose 250 (A) NEG mg/dL    Ketone 40 (A) NEG mg/dL    Bilirubin NEGATIVE  NEG      Blood NEGATIVE  NEG      Urobilinogen 1.0 0.2 - 1.0 EU/dL    Nitrites NEGATIVE  NEG      Leukocyte Esterase NEGATIVE  NEG     CULTURE, URINE    Collection Time: 03/08/17  3:15 PM   Result Value Ref Range    Special Requests: NO SPECIAL REQUESTS      Culture result: CULTURE IN PROGRESS,FURTHER UPDATES TO FOLLOW     GLUCOSE, POC    Collection Time: 03/08/17  4:34 PM   Result Value Ref Range    Glucose (POC) 145 (H) 70 - 110 mg/dL   GLUCOSE, POC    Collection Time: 03/08/17  9:46 PM   Result Value Ref Range    Glucose (POC) 200 (H) 70 - 110 mg/dL   GLUCOSE, POC    Collection Time: 03/09/17  8:29 AM   Result Value Ref Range    Glucose (POC) 158 (H) 70 - 110 mg/dL   CBC WITH AUTOMATED DIFF    Collection Time: 03/09/17  9:59 AM   Result Value Ref Range    WBC 11.8 4.6 - 13.2 K/uL    RBC 5.34 4.70 - 5.50 M/uL    HGB 16.1 (H) 13.0 - 16.0 g/dL    HCT 46.8 36.0 - 48.0 %    MCV 87.6 74.0 - 97.0 FL    MCH 30.1 24.0 - 34.0 PG    MCHC 34.4 31.0 - 37.0 g/dL    RDW 14.0 11.6 - 14.5 %    PLATELET 359 301 - 885 K/uL    MPV 10.2 9.2 - 11.8 FL    NEUTROPHILS 72 40 - 73 %    LYMPHOCYTES 16 (L) 21 - 52 %    MONOCYTES 8 3 - 10 %    EOSINOPHILS 3 0 - 5 %    BASOPHILS 1 0 - 2 %    ABS. NEUTROPHILS 8.5 (H) 1.8 - 8.0 K/UL    ABS. LYMPHOCYTES 1.9 0.9 - 3.6 K/UL    ABS. MONOCYTES 1.0 0.05 - 1.2 K/UL    ABS. EOSINOPHILS 0.4 0.0 - 0.4 K/UL    ABS.  BASOPHILS 0.1 0.0 - 0.1 K/UL    DF AUTOMATED     TSH 3RD GENERATION    Collection Time: 03/09/17 11:00 AM   Result Value Ref Range    TSH 1.52 0.36 - 3.74 uIU/mL   LIPID PANEL    Collection Time: 03/09/17 11:00 AM   Result Value Ref Range    LIPID PROFILE          Cholesterol, total 152 <200 MG/DL    Triglyceride 131 <150 MG/DL    HDL Cholesterol 49 40 - 60 MG/DL    LDL, calculated 76.8 0 - 100 MG/DL    VLDL, calculated 26.2 MG/DL    CHOL/HDL Ratio 3.1 0 - 5.0     GLUCOSE, POC    Collection Time: 03/09/17 11:45 AM   Result Value Ref Range    Glucose (POC) 194 (H) 70 - 110 mg/dL       Signed By: Chante Gonzales MD     March 9, 2017

## 2017-03-09 NOTE — PROGRESS NOTES
Care Management Interventions  Mode of Transport at Discharge: S  Transition of Care Consult (CM Consult): Discharge Planning  Physical Therapy Consult: Yes  Occupational Therapy Consult: Yes  Speech Therapy Consult: Yes  Current Support Network: Lives with Spouse  Confirm Follow Up Transport: Family  Plan discussed with Pt/Family/Caregiver: Yes  Discharge Location  Discharge Placement: Skilled nursing facility     [facility] informed CM that they will not be able to accept pt due to his behavior. CM contacted [facility] and admission liaison shares that she will submit information to insurance for auth.    4:00pm  Auth has been obtained and plan is for pt to transfer to [facility] tomorrow. Physician has been informed.

## 2017-03-09 NOTE — DIABETES MGMT
NUTRITIONAL ASSESSMENT & [facility] CONTROL CARE PLAN     Ranjan Winn           73 y.o.           3/6/2017                 Patient Active Problem List   Diagnosis Code    TIA (transient ischemic attack) G45.9    Acute CVA (cerebrovascular accident) ([facility] [address] I63.9    CVA (cerebral vascular accident) ([facility] [address] I63.9    Hyperglycemia R73.9     New T2DM      INTERVENTIONS/PLAN:     Diabetic education. Continue current diabetes medications. Monitor for need to add prandial insulin to aid with post prandial BG control. Will continue to monitor inpatient for intervention. ASSESSMENT:   Nutritional Status:     Pt is overweight related to excess caloric intake as evidenced by 121% ideal weight and BMI= 28.7kg/m2. Pt meets criteria for overweight. Diabetes Management:   Pt is 73 yr old male admitted on 3/6/17 with CVA. Pt newly dx T2DM. Recent blood glucose:   Results for Birttany Flannery (MRN [id number]) as of 3/9/2017 12:06   Ref. Range 3/8/2017 11:47 3/8/2017 16:34 3/8/2017 21:46 3/9/2017 08:29 3/9/2017 11:45   GLUCOSE,FAST - POC Latest Ref Range: 70 - 110 mg/dL 183 (H) 145 (H) 200 (H) 158 (H) 194 (H)     Within target range (non-ICU: <140; ICU<180): [] Yes   [x]  No    Current Insulin regimen: 12 units lantus, correctional lispro ACHS- very insulin resistant scale    HbA1c: 9.5% equivalent  to ave Blood Glucose of 226mg/dl for 2-3 months prior to admission    Adequate glycemic control PTA:  [] Yes  [x] No       SUBJECTIVE/OBJECTIVE:   Information obtained from: pt, nice, chart review, Dr. Jb Solis, RN    Diet: cardiac, 2 gram sodium, consistent carbohydrate 1800 kcal    Patient Vitals for the past 100 hrs:   % Diet Eaten   03/07/17 1200 0 %   03/07/17 0845 0 %     Pt reports good po intake.     Medications: [x]                Reviewed     Most Recent POC Glucose: Recent Labs      03/08/17   1315  03/06/17   2245   GLU  199*  204*         Labs:   Lab Results   Component Value Date/Time    Hemoglobin A1c 9.5 03/07/2017 01:20 PM     Lab Results   Component Value Date/Time    Sodium 138 03/08/2017 01:15 PM    Potassium 4.2 03/08/2017 01:15 PM    Chloride 102 03/08/2017 01:15 PM    CO2 27 03/08/2017 01:15 PM    Anion gap 9 03/08/2017 01:15 PM    Glucose 199 03/08/2017 01:15 PM    BUN 11 03/08/2017 01:15 PM    Creatinine 0.98 03/08/2017 01:15 PM    Calcium 8.9 03/08/2017 01:15 PM    Albumin 3.6 03/06/2017 10:45 PM       Anthropometrics: IBW : 73 kg (160 lb 15 oz), % IBW (Calculated): 120.79 %, BMI (calculated): 28.7  Wt Readings from Last 1 Encounters:   03/07/17 88.2 kg (194 lb 6.4 oz)    Ht Readings from Last 1 Encounters:   03/07/17 5' 9\" (1.753 m)       Estimated Nutrition Needs:   [x] MSJ REEx 1.2 (pt bed bound) 0466-1622 kcal/day Protein 0.8-1g/kg 71-88 g/day  Based on:   [x]          Actual BW (88.2 kg)   []          SBW   []            Adjusted BW        Nutrition Diagnoses:       Altered nutrition related lab values related to HbA1c as evidenced by HbA1c of 9.5%    Nutrition Interventions: Diabetic education  Goal:  Patient/family will demonstrate understanding of Diabetes Self Management Skills by: (date) _3/16/17  Intervention :Food/Nutrient Delivery: Yes  Intervention: Nutrition Education: Yes  Intervention: Nutrition Counseling: Yes     Nutrition Monitoring and Evaluation      [x]     Monitor po intake   [x]     Continue inpatient monitoring and intervention  [x]     BG Within target range (non-ICU: <140; ICU<180)  BY 3/12/17    Thania Elizalde MS, RD, CDE  Pager: [phone]

## 2017-03-09 NOTE — PROGRESS NOTES
Progress Note    Patient: Scott Herring MRN: [id number]  SSN: [id number]    YOB: 1945  Age: 73 y.o.   Sex: male      Admit Date: 3/6/2017    LOS: 3 days     Subjective:     Patient presents with left sided weakness slight improvement  Less cooperative with examination wife at bedside      Current Facility-Administered Medications   Medication Dose Route Frequency    insulin glargine (LANTUS) injection 12 Units  12 Units SubCUTAneous DAILY    aspirin chewable tablet 81 mg  81 mg Oral DAILY    HYDROcodone-acetaminophen (NORCO) 5-325 mg per tablet 1 Tab  1 Tab Oral Q4H PRN    sodium chloride (NS) flush 5-10 mL  5-10 mL IntraVENous Q8H    sodium chloride (NS) flush 5-10 mL  5-10 mL IntraVENous PRN    atorvastatin (LIPITOR) tablet 40 mg  40 mg Oral QHS    acetaminophen (TYLENOL) tablet 650 mg  650 mg Oral Q4H PRN    labetalol (NORMODYNE;TRANDATE) injection 5 mg  5 mg IntraVENous Q10MIN PRN    famotidine (PEPCID) tablet 20 mg  20 mg Oral Q12H    heparin (porcine) injection 5,000 Units  5,000 Units SubCUTAneous Q8H    haloperidol lactate (HALDOL) injection 5 mg  5 mg IntraMUSCular Q8H PRN    labetalol (NORMODYNE;TRANDATE) 20 mg/4 mL (5 mg/mL) injection 5 mg  5 mg IntraVENous Q10MIN PRN    insulin lispro (HUMALOG) injection   SubCUTAneous AC&HS    glucose chewable tablet 16 g  4 Tab Oral PRN    glucagon (GLUCAGEN) injection 1 mg  1 mg IntraMUSCular PRN    dextrose (D50W) injection syrg 12.5-25 g  25-50 mL IntraVENous PRN          Objective:     Vitals:    03/09/17 0003 03/09/17 0424 03/09/17 0810 03/09/17 1234   BP: (!) 198/122 (!) 168/93 (!) 165/91 (!) 161/96   Pulse: 97 90 92 72   Resp: 18 17 17 18   Temp: 99.1 °F (37.3 °C) 98.4 °F (36.9 °C) 97.9 °F (36.6 °C) 98 °F (36.7 °C)   SpO2: 97% 96% 97% 96%   Weight:       Height:            Intake and Output:  Current Shift: 03/09 0701 - 03/09 1900  In: -   Out: 150 [Urine:150]  Last three shifts: 03/07 1901 - 03/09 0700  In: 120 [P.O.:120]  Out: 350 [Urine:350]    Physical Exam:   GENERAL: alert, cooperative, no distress, appears stated age  NEUROLOGIC: negative findings: alert, oriented x3  speech normal in context and clarity  cranial nerves II-XII intact  motor strength: full proximally and distally right side   no involuntary movements - tremors, positive findings: weakness lle worse than  lue refuses to be touched    Lab/Data Review:  Recent Results (from the past 24 hour(s))   URINALYSIS W/ RFLX MICROSCOPIC    Collection Time: 03/08/17  3:15 PM   Result Value Ref Range    Color YELLOW      Appearance CLEAR      Specific gravity 1.023 1.005 - 1.030      pH (UA) 5.0 5.0 - 8.0      Protein NEGATIVE  NEG mg/dL    Glucose 250 (A) NEG mg/dL    Ketone 40 (A) NEG mg/dL    Bilirubin NEGATIVE  NEG      Blood NEGATIVE  NEG      Urobilinogen 1.0 0.2 - 1.0 EU/dL    Nitrites NEGATIVE  NEG      Leukocyte Esterase NEGATIVE  NEG     CULTURE, URINE    Collection Time: 03/08/17  3:15 PM   Result Value Ref Range    Special Requests: NO SPECIAL REQUESTS      Culture result: CULTURE IN PROGRESS,FURTHER UPDATES TO FOLLOW     GLUCOSE, POC    Collection Time: 03/08/17  4:34 PM   Result Value Ref Range    Glucose (POC) 145 (H) 70 - 110 mg/dL   GLUCOSE, POC    Collection Time: 03/08/17  9:46 PM   Result Value Ref Range    Glucose (POC) 200 (H) 70 - 110 mg/dL   GLUCOSE, POC    Collection Time: 03/09/17  8:29 AM   Result Value Ref Range    Glucose (POC) 158 (H) 70 - 110 mg/dL   CBC WITH AUTOMATED DIFF    Collection Time: 03/09/17  9:59 AM   Result Value Ref Range    WBC 11.8 4.6 - 13.2 K/uL    RBC 5.34 4.70 - 5.50 M/uL    HGB 16.1 (H) 13.0 - 16.0 g/dL    HCT 46.8 36.0 - 48.0 %    MCV 87.6 74.0 - 97.0 FL    MCH 30.1 24.0 - 34.0 PG    MCHC 34.4 31.0 - 37.0 g/dL    RDW 14.0 11.6 - 14.5 %    PLATELET 019 370 - 321 K/uL    MPV 10.2 9.2 - 11.8 FL    NEUTROPHILS 72 40 - 73 %    LYMPHOCYTES 16 (L) 21 - 52 %    MONOCYTES 8 3 - 10 %    EOSINOPHILS 3 0 - 5 %    BASOPHILS 1 0 - 2 %    ABS. NEUTROPHILS 8.5 (H) 1.8 - 8.0 K/UL    ABS. LYMPHOCYTES 1.9 0.9 - 3.6 K/UL    ABS. MONOCYTES 1.0 0.05 - 1.2 K/UL    ABS. EOSINOPHILS 0.4 0.0 - 0.4 K/UL    ABS. BASOPHILS 0.1 0.0 - 0.1 K/UL    DF AUTOMATED     TSH 3RD GENERATION    Collection Time: 03/09/17 11:00 AM   Result Value Ref Range    TSH 1.52 0.36 - 3.74 uIU/mL   LIPID PANEL    Collection Time: 03/09/17 11:00 AM   Result Value Ref Range    LIPID PROFILE          Cholesterol, total 152 <200 MG/DL    Triglyceride 131 <150 MG/DL    HDL Cholesterol 49 40 - 60 MG/DL    LDL, calculated 76.8 0 - 100 MG/DL    VLDL, calculated 26.2 MG/DL    CHOL/HDL Ratio 3.1 0 - 5.0     GLUCOSE, POC    Collection Time: 03/09/17 11:45 AM   Result Value Ref Range    Glucose (POC) 194 (H) 70 - 110 mg/dL      FINDINGS:      Approximately 1 cm axial plane lesion in the right posterior lentiform nucleus  with hyperintense and restricted diffusion consistent with an acute infarct. Has  some extension craniad to the corona radiata. No other acute infarcts. There is  grossly patent flow signal in the basilar artery and both internal carotid  arteries. Global cerebral and cerebellar volume loss. There is advanced atrophy  in the right greater than left anterior and medial temporal lobes with  associated ex vacuo enlargement of the temporal horn on the right. Some patchy increased T2 FLAIR signal in the periventricular white matter  consistent with chronic small vessel ischemic changes. No findings of chronic  cortical infarct or mass lesion. No shift of midline structures. No  hydrocephalus. There is no chronic hemorrhage seen on GRE images. Mild mucosal thickening right maxillary sinus. Sinuses are otherwise clear. No suspicious marrow signal in the calvarium. Marrow signal not well seen in the  upper cervical spine. IMPRESSION  IMPRESSION:     Motion degraded exam with some loss of details.      Acute infarct in the right posterior lentiform nucleus with extension into the  corona radiata. Global cerebral and cerebellar volume loss. More pronounced volume loss in the  anterior temporal lobes, right is worse than left. More asymmetry than expected  for patients with Alzheimer's dementia. Nonspecific. Periventricular white matter lucencies bilaterally likely chronic small vessel  ischemic changes. No definite mass lesion or chronic infarcts. Findings: Contiguous noncontrast axial images of the brain are obtained from the  foramen magnum to the vertex and reviewed in brain and bone windows. There is some decreased attenuation in the periventricular deep cerebral white  matter, bilaterally. There are several subtle foci of decreased signal  intensity in the corona radiata, bilaterally there is a small focal defect in  the posterosuperior right lenticular nucleus possible small lacunar infarct, age  indeterminate. There is no mass, mass effect or hemorrhage. The cerebrospinal  fluid spaces are mildly prominent but symmetrical.  No significant extra-axial  abnormalities are seen. The pituitary fossa is unremarkable. There is mild  mucosal thickening right maxillary sinus. There is some mild mucosal thickening  in scattered ethmoid air cells, bilaterally. The remaining paranasal sinuses  are clear. The mastoid air cells are clear. No significant bony abnormalities  are seen. IMPRESSION  IMPRESSION[de-identified]      No definite acute ischemic change or hemorrhage. Possible small lacunar infarct  posterosuperior right lenticular nucleus, age indeterminate. Mild burden  nonspecific deep cerebral white matter lucencies, most likely chronic  microvascular ischemic change. Mild age-related atrophy without other specific  parenchymal abnormality.         Ct imaging personally reviewed     Assessment:   cva  Newly diagnosed diabetes and hypertension      Active Problems:    TIA (transient ischemic attack) (3/6/2017)      Acute CVA (cerebrovascular accident) ([facility] [address] (3/6/2017)      CVA (cerebral vascular accident) [facility]) (3/7/2017)      Hyperglycemia (3/7/2017)        Plan:     Reviewed findings with patient and wife as well as hospitalist Awaiting mra studies/echo  for further recommendations    Signed By: Autumn Dumont MD     March 9, 2017

## 2017-03-10 VITALS
OXYGEN SATURATION: 97 % | TEMPERATURE: 97.1 F | HEIGHT: 69 IN | HEART RATE: 94 BPM | BODY MASS INDEX: 28.82 KG/M2 | DIASTOLIC BLOOD PRESSURE: 102 MMHG | SYSTOLIC BLOOD PRESSURE: 166 MMHG | RESPIRATION RATE: 18 BRPM | WEIGHT: 194.59 LBS

## 2017-03-10 LAB
BACTERIA SPEC CULT: NORMAL
GLUCOSE BLD STRIP.AUTO-MCNC: 136 MG/DL (ref 70–110)
GLUCOSE BLD STRIP.AUTO-MCNC: 140 MG/DL (ref 70–110)
GLUCOSE BLD STRIP.AUTO-MCNC: 178 MG/DL (ref 70–110)
GLUCOSE BLD STRIP.AUTO-MCNC: 190 MG/DL (ref 70–110)
SERVICE CMNT-IMP: NORMAL

## 2017-03-10 PROCEDURE — 74011250636 HC RX REV CODE- 250/636: Performed by: HOSPITALIST

## 2017-03-10 PROCEDURE — 82962 GLUCOSE BLOOD TEST: CPT

## 2017-03-10 PROCEDURE — 74011250636 HC RX REV CODE- 250/636: Performed by: INTERNAL MEDICINE

## 2017-03-10 PROCEDURE — 74011250637 HC RX REV CODE- 250/637: Performed by: HOSPITALIST

## 2017-03-10 PROCEDURE — 74011250637 HC RX REV CODE- 250/637: Performed by: INTERNAL MEDICINE

## 2017-03-10 PROCEDURE — 74011636637 HC RX REV CODE- 636/637: Performed by: HOSPITALIST

## 2017-03-10 RX ORDER — INSULIN GLARGINE 100 [IU]/ML
12 INJECTION, SOLUTION SUBCUTANEOUS DAILY
Qty: 1 VIAL | Refills: 0 | Status: SHIPPED
Start: 2017-03-10

## 2017-03-10 RX ORDER — FAMOTIDINE 20 MG/1
20 TABLET, FILM COATED ORAL
Qty: 30 TAB | Refills: 0 | Status: SHIPPED
Start: 2017-03-10

## 2017-03-10 RX ORDER — METOPROLOL TARTRATE 25 MG/1
25 TABLET, FILM COATED ORAL 2 TIMES DAILY
Qty: 60 TAB | Refills: 0 | Status: SHIPPED
Start: 2017-03-10

## 2017-03-10 RX ORDER — INSULIN LISPRO 100 [IU]/ML
INJECTION, SOLUTION INTRAVENOUS; SUBCUTANEOUS
Qty: 1 VIAL | Refills: 0 | Status: SHIPPED
Start: 2017-03-10

## 2017-03-10 RX ORDER — ATORVASTATIN CALCIUM 40 MG/1
80 TABLET, FILM COATED ORAL
Status: DISCONTINUED | OUTPATIENT
Start: 2017-03-10 | End: 2017-03-10 | Stop reason: HOSPADM

## 2017-03-10 RX ORDER — GUAIFENESIN 100 MG/5ML
81 LIQUID (ML) ORAL DAILY
Qty: 30 TAB | Refills: 0 | Status: SHIPPED
Start: 2017-03-10

## 2017-03-10 RX ORDER — HYDROCODONE BITARTRATE AND ACETAMINOPHEN 5; 325 MG/1; MG/1
1 TABLET ORAL
Qty: 15 TAB | Refills: 0 | Status: SHIPPED | OUTPATIENT
Start: 2017-03-10

## 2017-03-10 RX ORDER — ATORVASTATIN CALCIUM 80 MG/1
80 TABLET, FILM COATED ORAL
Qty: 30 TAB | Refills: 0 | Status: SHIPPED
Start: 2017-03-10

## 2017-03-10 RX ADMIN — FAMOTIDINE 20 MG: 20 TABLET ORAL at 09:00

## 2017-03-10 RX ADMIN — ASPIRIN 81 MG CHEWABLE TABLET 81 MG: 81 TABLET CHEWABLE at 09:00

## 2017-03-10 RX ADMIN — Medication 10 ML: at 05:44

## 2017-03-10 RX ADMIN — INSULIN LISPRO 3 UNITS: 100 INJECTION, SOLUTION INTRAVENOUS; SUBCUTANEOUS at 00:49

## 2017-03-10 RX ADMIN — HYDROCODONE BITARTRATE AND ACETAMINOPHEN 1 TABLET: 5; 325 TABLET ORAL at 08:59

## 2017-03-10 RX ADMIN — HYDROCODONE BITARTRATE AND ACETAMINOPHEN 1 TABLET: 5; 325 TABLET ORAL at 18:13

## 2017-03-10 RX ADMIN — HEPARIN SODIUM 5000 UNITS: 5000 INJECTION, SOLUTION INTRAVENOUS; SUBCUTANEOUS at 00:45

## 2017-03-10 RX ADMIN — INSULIN LISPRO 3 UNITS: 100 INJECTION, SOLUTION INTRAVENOUS; SUBCUTANEOUS at 12:30

## 2017-03-10 RX ADMIN — INSULIN GLARGINE 12 UNITS: 100 INJECTION, SOLUTION SUBCUTANEOUS at 09:00

## 2017-03-10 RX ADMIN — HEPARIN SODIUM 5000 UNITS: 5000 INJECTION, SOLUTION INTRAVENOUS; SUBCUTANEOUS at 09:00

## 2017-03-10 NOTE — DISCHARGE SUMMARY
Transfer Summary    Patient: Madeline Braun MRN: [id number]  CSN: [id number]    YOB: 1945  Age: 73 y.o. Sex: male    DOA: 3/6/2017 LOS:  LOS: 4 days   Discharge Date:      Admission Diagnoses: TIA (transient ischemic attack)  CVA (cerebral vascular accident) [facility])    Discharge Diagnoses:  Please see Dictation. Discharge Condition: Stable    PHYSICAL EXAM  Visit Vitals    BP (!) 168/108    Pulse (!) 101    Temp 97.9 °F (36.6 °C)    Resp 18    Ht 5' 9\" (1.753 m)    Wt 88.3 kg (194 lb 9.4 oz)    SpO2 97%    BMI 29.59 kg/m2       General: Alert, cooperative, no acute distress    Lungs:  CTA Bilaterally. Heart:             Regular rate and Rhythm. Abdomen: Soft, Non distended, Non tender. + Bowel sounds. Extremities: Left knee, mild swelling, no erythema or redness. No edema or cyanosis   Neurologic:  AA, oriented X 3. Left UE 4/5, LLE 2/5, rest 5/5. Hospital Course: Please see dictation. code # [id number]. Discharge Medications:     Current Discharge Medication List      START taking these medications    Details   aspirin 81 mg chewable tablet Take 1 Tab by mouth daily. Qty: 30 Tab, Refills: 0      atorvastatin (LIPITOR) 80 mg tablet Take 1 Tab by mouth nightly. Qty: 30 Tab, Refills: 0      famotidine (PEPCID) 20 mg tablet Take 1 Tab by mouth nightly. Qty: 30 Tab, Refills: 0      insulin glargine (LANTUS) 100 unit/mL injection 12 Units by SubCUTAneous route daily. Qty: 1 Vial, Refills: 0      insulin lispro (HUMALOG) 100 unit/mL injection SubCUTAneous route Before breakfast, lunch, dinner and at bedtime. For Blood Sugar (mg/dL) of:     Less than 150 =   0 units           150 -199 =   2 units  200 -249 =   4 units  250 -299 =   6 units  300 -349 =   8 units  350 and above =  10 units  Qty: 1 Vial, Refills: 0      HYDROcodone-acetaminophen (NORCO) 5-325 mg per tablet Take 1 Tab by mouth every six (6) hours as needed. Max Daily Amount: 4 Tabs.   Qty: 15 Tab, Refills: 0      metoprolol tartrate (LOPRESSOR) 25 mg tablet Take 1 Tab by mouth two (2) times a day. Qty: 60 Tab, Refills: 0             DIET:  Cardiac Diet and Diabetic Diet    ACTIVITY: Activity as tolerated  Patient needs to be on Fall, aspiration, decubitus precaution. ·    PT/OT consult  ·             Speech consult    ADDITIONAL INFORMATION: If you experience any of the following symptoms but not limited to Fever, chills, nausea, vomiting, diarrhea, change in mentation, falling, bleeding, shortness of breath, chest pain, please call your primary care physician or return to the emergency room if you cannot get hold of your doctor:     FOLLOW UP CARE:  Follow-up with 1.  Physician at SNF in 1-2 days with Cbc with diff, bmp, mg.                            2. Dr. Shun Mcdermott, neurology in 1 month                               Pt's PCP: Tona Curry MD.    Minutes spent on discharge: >40 minutes spent coordinating this discharge (review instructions/follow-up, prescriptions, preparing report for sign off)    Audie Denson MD  3/10/2017 4:53 PM

## 2017-03-10 NOTE — PROGRESS NOTES
Patient refused echocardiogram, no further attempts will be made as this was the fourth attempt to complete the exam.

## 2017-03-10 NOTE — PROGRESS NOTES
TRANSPORTATION SET-UP FOR 4:30 P. [facility]. Transportation cancelled and placed on \"Will Call\"  Pt discharged and plan for discharge transport is 7:30 p.m.

## 2017-03-10 NOTE — ROUTINE PROCESS
Bedside and Verbal shift change report given to [employer] (oncoming nurse) by BETH Valerio RN (offgoing nurse). Report given with SBAR, Kardex, MAR and Recent Results.

## 2017-03-10 NOTE — PROGRESS NOTES
Progress Note    Patient: Ranjan Winn MRN: [id number]  SSN: [id number]    YOB: 1945  Age: 73 y.o.   Sex: male      Admit Date: 3/6/2017    LOS: 4 days     Subjective:     Patient presents with left sided weakness slight improvement wife at bedside      Current Facility-Administered Medications   Medication Dose Route Frequency    atorvastatin (LIPITOR) tablet 80 mg  80 mg Oral QHS    insulin glargine (LANTUS) injection 12 Units  12 Units SubCUTAneous DAILY    aspirin chewable tablet 81 mg  81 mg Oral DAILY    HYDROcodone-acetaminophen (NORCO) 5-325 mg per tablet 1 Tab  1 Tab Oral Q4H PRN    sodium chloride (NS) flush 5-10 mL  5-10 mL IntraVENous Q8H    sodium chloride (NS) flush 5-10 mL  5-10 mL IntraVENous PRN    acetaminophen (TYLENOL) tablet 650 mg  650 mg Oral Q4H PRN    labetalol (NORMODYNE;TRANDATE) injection 5 mg  5 mg IntraVENous Q10MIN PRN    famotidine (PEPCID) tablet 20 mg  20 mg Oral Q12H    heparin (porcine) injection 5,000 Units  5,000 Units SubCUTAneous Q8H    haloperidol lactate (HALDOL) injection 5 mg  5 mg IntraMUSCular Q8H PRN    labetalol (NORMODYNE;TRANDATE) 20 mg/4 mL (5 mg/mL) injection 5 mg  5 mg IntraVENous Q10MIN PRN    insulin lispro (HUMALOG) injection   SubCUTAneous AC&HS    glucose chewable tablet 16 g  4 Tab Oral PRN    glucagon (GLUCAGEN) injection 1 mg  1 mg IntraMUSCular PRN    dextrose (D50W) injection syrg 12.5-25 g  25-50 mL IntraVENous PRN          Objective:     Vitals:    03/10/17 0400 03/10/17 0618 03/10/17 0835 03/10/17 1215   BP: 132/79  (!) 148/95 (!) 168/108   Pulse: 92  (!) 102 (!) 101   Resp: 18  18 18   Temp: 97.8 °F (36.6 °C)  97.8 °F (36.6 °C) 97.9 °F (36.6 °C)   SpO2: 94%  96% 97%   Weight:  88.3 kg (194 lb 9.4 oz)     Height:            Intake and Output:  Current Shift:    Last three shifts: 03/08 1901 - 03/10 0700  In: 120 [P.O.:120]  Out: 650 [Urine:650]    Physical Exam:   GENERAL: asleep  Neuro not tested    Lab/Data Review:  Recent Results (from the past 24 hour(s))   GLUCOSE, POC    Collection Time: 03/09/17  4:43 PM   Result Value Ref Range    Glucose (POC) 170 (H) 70 - 110 mg/dL   GLUCOSE, POC    Collection Time: 03/10/17 12:47 AM   Result Value Ref Range    Glucose (POC) 178 (H) 70 - 110 mg/dL   GLUCOSE, POC    Collection Time: 03/10/17  6:35 AM   Result Value Ref Range    Glucose (POC) 140 (H) 70 - 110 mg/dL   GLUCOSE, POC    Collection Time: 03/10/17 11:08 AM   Result Value Ref Range    Glucose (POC) 190 (H) 70 - 110 mg/dL      FINDINGS:      Approximately 1 cm axial plane lesion in the right posterior lentiform nucleus  with hyperintense and restricted diffusion consistent with an acute infarct. Has  some extension craniad to the corona radiata. No other acute infarcts. There is  grossly patent flow signal in the basilar artery and both internal carotid  arteries. Global cerebral and cerebellar volume loss. There is advanced atrophy  in the right greater than left anterior and medial temporal lobes with  associated ex vacuo enlargement of the temporal horn on the right. Some patchy increased T2 FLAIR signal in the periventricular white matter  consistent with chronic small vessel ischemic changes. No findings of chronic  cortical infarct or mass lesion. No shift of midline structures. No  hydrocephalus. There is no chronic hemorrhage seen on GRE images. Mild mucosal thickening right maxillary sinus. Sinuses are otherwise clear. No suspicious marrow signal in the calvarium. Marrow signal not well seen in the  upper cervical spine. IMPRESSION  IMPRESSION:     Motion degraded exam with some loss of details. Acute infarct in the right posterior lentiform nucleus with extension into the  corona radiata. Global cerebral and cerebellar volume loss. More pronounced volume loss in the  anterior temporal lobes, right is worse than left. More asymmetry than expected  for patients with Alzheimer's dementia. Nonspecific. Periventricular white matter lucencies bilaterally likely chronic small vessel  ischemic changes. No definite mass lesion or chronic infarcts. Findings: Contiguous noncontrast axial images of the brain are obtained from the  foramen magnum to the vertex and reviewed in brain and bone windows. There is some decreased attenuation in the periventricular deep cerebral white  matter, bilaterally. There are several subtle foci of decreased signal  intensity in the corona radiata, bilaterally there is a small focal defect in  the posterosuperior right lenticular nucleus possible small lacunar infarct, age  indeterminate. There is no mass, mass effect or hemorrhage. The cerebrospinal  fluid spaces are mildly prominent but symmetrical.  No significant extra-axial  abnormalities are seen. The pituitary fossa is unremarkable. There is mild  mucosal thickening right maxillary sinus. There is some mild mucosal thickening  in scattered ethmoid air cells, bilaterally. The remaining paranasal sinuses  are clear. The mastoid air cells are clear. No significant bony abnormalities  are seen. IMPRESSION  IMPRESSION[de-identified]      No definite acute ischemic change or hemorrhage. Possible small lacunar infarct  posterosuperior right lenticular nucleus, age indeterminate. Mild burden  nonspecific deep cerebral white matter lucencies, most likely chronic  microvascular ischemic change. Mild age-related atrophy without other specific  parenchymal abnormality. Ct imaging personally reviewed     Mri imaging reviewed personally  mra imaging    TECHNIQUE: Brain arteries scanned with axial 3D TOF source images from near  foramen magnum up to sylvian region, MIP reformatted images generated by  technologist.      FINDINGS:         At the right anterior circulation, the right ICA is patent to the terminus. No  stenosis.  Moderate-sized right posterior communicating artery appears normal.  Right YAYA is widely patent. MCA M1 segment normal caliber. Proximal bifurcation. Approximately 5 mm length segment of moderate to high-grade narrowing in what  appears to be the superior MCA M2 division. Beyond the stenosis there is patent  flow visible. The inferior division has decreased flow signal, uncertain if this  is dephasing artifact or related to stenosis. Likewise, limited flow signal  within the anterior temporal branch. At the left anterior circulation, left ICA is patent to the terminus. No focal  stenosis. Moderate sized posterior communicating artery. YAYA A1 segment is  hypoplastic relative to the right, but patent second-order branch. There may be  mild narrowing at the A1 origin. MCA patent. No definite stenosis to the  proximal second-order branches. At the posterior circulation, intradural vertebral arteries are patent. Basilar  appears normal, slightly small caliber secondary to the dominant anterior  circulation and prominent posterior communicating arteries. Posterior cerebral  arteries are patent. IMPRESSION  IMPRESSION:     Abnormal findings in the right MCA. Moderate to high-grade stenosis in the  proximal superior M2 MCA division with patent flow beyond the narrowing. Limited  flow within the smaller inferior M2 division and anterior temporal branch  vessels, may be some combination of dephasing artifacts and/or stenosis. No definite significant stenosis in the left anterior circulation or posterior  circulation. MRA NECK WITHOUT CONTRAST     HISTORY:  Stroke. COMPARISON: No prior MRA or CTA neck. TECHNIQUE: Noncontrast scanning of the neck arteries focused at the carotid  artery bifurcations was accomplished with 2D TOF technique. Additional axial 3-D  TOF SPGR images from the low neck to the region of the skull base. Images were  post processed with MIP algorithm.      CONTRAST: None     FINDINGS:      Antegrade flow seen within the bilateral cervical carotid and vertebral  arteries. Aortic Arch: Limited evaluation without IV gadolinium contrast. Common trunk  origin of innominate and left CCA. Right Carotid: CCA is patent to the bifurcation. No definite stenosis at the ICA  origin. Right Vertebral: Origin from the right subclavian poorly imaged on noncontrast  MRA. Above the origin, vessel is patent     Left Carotid: CCA is patent to the bifurcation. Mild stenosis at the ICA origin,  estimated less than 50% stenosis. Patent above the bifurcation. Left Vertebral: Origin from the left subclavian poorly imaged on noncontrast  MRA. Above the origin the vessel is patent. IMPRESSION  IMPRESSION:     Estimated mild stenosis in the left ICA origin, less than 50% NASCET narrowing. No stenosis at the right ICA origin. Both vertebral arteries are patent. FINDINGS:      Approximately 1 cm axial plane lesion in the right posterior lentiform nucleus  with hyperintense and restricted diffusion consistent with an acute infarct. Has  some extension craniad to the corona radiata. No other acute infarcts. There is  grossly patent flow signal in the basilar artery and both internal carotid  arteries. Global cerebral and cerebellar volume loss. There is advanced atrophy  in the right greater than left anterior and medial temporal lobes with  associated ex vacuo enlargement of the temporal horn on the right. Some patchy increased T2 FLAIR signal in the periventricular white matter  consistent with chronic small vessel ischemic changes. No findings of chronic  cortical infarct or mass lesion. No shift of midline structures. No  hydrocephalus. There is no chronic hemorrhage seen on GRE images. Mild mucosal thickening right maxillary sinus. Sinuses are otherwise clear. No suspicious marrow signal in the calvarium. Marrow signal not well seen in the  upper cervical spine. IMPRESSION  IMPRESSION:     Motion degraded exam with some loss of details.      Acute infarct in the right posterior lentiform nucleus with extension into the  corona radiata. Global cerebral and cerebellar volume loss. More pronounced volume loss in the  anterior temporal lobes, right is worse than left. More asymmetry than expected  for patients with Alzheimer's dementia. Nonspecific. Periventricular white matter lucencies bilaterally likely chronic small vessel  ischemic changes. No definite mass lesion or chronic infarcts. Findings: Contiguous noncontrast axial images of the brain are obtained from the  foramen magnum to the vertex and reviewed in brain and bone windows. There is some decreased attenuation in the periventricular deep cerebral white  matter, bilaterally. There are several subtle foci of decreased signal  intensity in the corona radiata, bilaterally there is a small focal defect in  the posterosuperior right lenticular nucleus possible small lacunar infarct, age  indeterminate. There is no mass, mass effect or hemorrhage. The cerebrospinal  fluid spaces are mildly prominent but symmetrical.  No significant extra-axial  abnormalities are seen. The pituitary fossa is unremarkable. There is mild  mucosal thickening right maxillary sinus. There is some mild mucosal thickening  in scattered ethmoid air cells, bilaterally. The remaining paranasal sinuses  are clear. The mastoid air cells are clear. No significant bony abnormalities  are seen. IMPRESSION  IMPRESSION[de-identified]      No definite acute ischemic change or hemorrhage. Possible small lacunar infarct  posterosuperior right lenticular nucleus, age indeterminate. Mild burden  nonspecific deep cerebral white matter lucencies, most likely chronic  microvascular ischemic change. Mild age-related atrophy without other specific  parenchymal abnormality.       Assessment:   cva  Newly diagnosed diabetes and hypertension      Active Problems:    TIA (transient ischemic attack) (3/6/2017)      Acute CVA (cerebrovascular accident) ([facility] [address] (3/6/2017)      CVA (cerebral vascular accident) ([facility] [address] (3/7/2017)      Hyperglycemia (3/7/2017)        Plan:     Reviewed findings withwife as well as hospitalist will maximize statin. Wife requests Psychiatry consult before discharge    Signed By: Chanda Ross MD     March 10, 2017

## 2017-03-10 NOTE — DISCHARGE SUMMARY
[place] [address]  [address] [place] [place] SUMMARY    Name:  Jaci Kumar  MR#:  [id number]  :  1945  Account #:  [de-identified]  Date of Adm:  2017  Date of Discharge:  03/10/2017      PRIMARY CARE PHYSICIAN: Loulou Piedra. Michele Early MD    DISPOSITION: Transfer to skilled nursing home. DISCHARGE CONDITION: Stable. DISCHARGE DIAGNOSES  1. Acute cerebrovascular accident with left hemiparesis. 2. Left knee osteoarthritis. 3. New onset diabetes mellitus type 2.  4. Hypertension, permissive. 5. Leukocytosis, resolved now. 6. Dyslipidemia. 7. Noncompliance to the medication. DISCHARGE MEDICATIONS  1. Aspirin 81 mg daily. 2. Lipitor 80 mg at bedtime. 3. Pepcid 20 mg at bedtime. 4. Norco 1 tablet every 6 hours p.r.n.  5. Lantus 20 units subcutaneous daily. 6. Humalog sliding scale insulin coverage. 7. Metoprolol 25 mg b.i.d. Dose needs to be adjusted to keep blood  pressure less than 140/70. [address] . [facility] Consultation with Marisol Hatch MD, Neurology consultation. [address]  1. The patient had a CT head on admission which showed a possible  small lacunar infarct, right lenticular nucleus. 2. The patient had MRI brain which confirmed a motion degraded  exam with loss of details, acute infarct, right posterior lentiform nucleus  with extension into the corona radiata. 3. MRA brain showed abnormal findings, right MCA moderate to high-  grade stenosis proximal superior to MCA division, patent flow beyond  the narrowing. 4. MRA of neck shows mild stenosis, left ICA origin, less than 50%  narrowing. No stenosis right ICA origin, both vertebral arteries are  patent. HOSPITAL COURSE: This is a 81-year-old  male who  presents to the emergency room with left-sided weakness. He is noted  to have possible acute stroke in the right part of the brain. He had a CT  head done in the ED.  The patient was admitted to the hospital, was  started on aspirin and statin and Neurology was consulted. A MRI and  MRA brain was ordered. Echo was also ordered. The patient declined  echo 4 times in the hospital. After multiple attempts, he continued to  decline. I did talk to him and every time I talked him, he had agrees to  do echo, but when he goes down, he declines it. An echo was not  done, but it could be done as an outpatient. He had MRI which  confirmed stroke and MRA head and neck was done which showed  some right MCA distribution narrowing. Discussed with the neurologist,  Dr. Raimundo Talavera, who saw the patient and recommended to continue  aspirin alone. No need for Plavix. Recommended to continue the  patient on high-dose statin. 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 \" But still does not believe that he had a stroke. Possibly is  in denial. Per family, he had this personality from before and this is not  something new, but then he check his orientation is oriented x4, and  he talks about politics, everything appropriately. Initially, we thought of  getting a psychiatric consultation, but we do not have any psychiatrists  evaluating today. He has to stay until tomorrow to get evaluation  and that would make him stay here until Monday as he would not be  able to accept to go to rehabilitation. Since his issue is not acute and is  not life-threatening, so he can be seen as an outpatient. I have  discussed with the neurologist, Dr. Raimundo Talavera, who agrees with the  disposition. I also discussed with the patient's niece at the bedside,  Lazarus Apa, and also agrees with the disposition.  The patient is currently  hemodynamically and medically stable for discharge. The patient will  be transferred to skilled nursing home today. I have discussed with Lakia Gant, from crisis. Per him, a psychiatrist will not  be able to see him until tomorrow. Since this is not an acute or an  emergent consultation, the patient will be seen as an outpatient with  the psychiatrist and I have discussed with the family about it. Discharge instructions, followup appointments and physical exam,  please refer to the electronic medical records. The patient is alert, awake, oriented x3. He is able to answer questions  without any problem. I discussed with him and also with his niece at  the bedside about the discharge plan and followup appointments. Both  of them understood and agreed with the plan. I also answered all their  questions and concerns at the bedside appropriately.         Alex Han MD BT / TW  D:  03/10/2017   17:01  T:  03/10/2017   17:40  Job #:  [id number]

## 2017-03-11 NOTE — DISCHARGE INSTRUCTIONS
Patient armband removed and shredded  MyChart Activation    Thank you for requesting access to [facility]. Please follow the instructions below to securely access and download your online medical record. [facility] allows you to send messages to your doctor, view your test results, renew your prescriptions, schedule appointments, and more. How Do I Sign Up? 1. In your internet browser, go to [email or web address]  2. Click on the First Time User? Click Here link in the Sign In box. You will be redirect to the New Member Sign Up page. 3. Enter your [facility] Access Code exactly as it appears below. You will not need to use this code after youve completed the sign-up process. If you do not sign up before the expiration date, you must request a new code. [facility] Access Code: [email or web address]  Expires: 2017  8:58 PM (This is the date your [facility]o access code will )    4. Enter the last four digits of your Social Security Number (xxxx) and Date of Birth (mm/dd/yyyy) as indicated and click Submit. You will be taken to the next sign-up page. 5. Create a [facility]ameo ID. This will be your [facility]o login ID and cannot be changed, so think of one that is secure and easy to remember. 6. Create a [facility] password. You can change your password at any time. 7. Enter your Password Reset Question and Answer. This can be used at a later time if you forget your password. 8. Enter your e-mail address. You will receive e-mail notification when new information is available in [address]. 9. Click Sign Up. You can now view and download portions of your medical record. 10. Click the Download Summary menu link to download a portable copy of your medical information. Additional Information    If you have questions, please visit the Frequently Asked Questions section of the [employer] website at [email or web address] Remember, [employer] is NOT to be used for urgent needs.  For medical emergencies, dial 911.        DISCHARGE SUMMARY from Nurse    The following personal items are in your possession at time of discharge:    Dental Appliances: None  Visual Aid: None     Home Medications: None  Jewelry: None  Clothing: At bedside  Other Valuables: None             PATIENT INSTRUCTIONS:    After general anesthesia or intravenous sedation, for 24 hours or while taking prescription Narcotics:  · Limit your activities  · Do not drive and operate hazardous machinery  · Do not make important personal or business decisions  · Do  not drink alcoholic beverages  · If you have not urinated within 8 hours after discharge, please contact your surgeon on call. Report the following to your surgeon:  · Excessive pain, swelling, redness or odor of or around the surgical area  · Temperature over 100.5  · Nausea and vomiting lasting longer than 4 hours or if unable to take medications  · Any signs of decreased circulation or nerve impairment to extremity: change in color, persistent  numbness, tingling, coldness or increase pain  · Any questions        What to do at Home:  Recommended activity: Activity as tolerated,     If you experience any of the following symptoms shortness of breath, chest pain, signs of stroke see fast, please follow up with ED or Primary Md. *  Please give a list of your current medications to your Primary Care Provider. *  Please update this list whenever your medications are discontinued, doses are      changed, or new medications (including over-the-counter products) are added. *  Please carry medication information at all times in case of emergency situations. These are general instructions for a healthy lifestyle:    No smoking/ No tobacco products/ Avoid exposure to second hand smoke    Surgeon General's Warning:  Quitting smoking now greatly reduces serious risk to your health.     Obesity, smoking, and sedentary lifestyle greatly increases your risk for illness    A healthy diet, regular physical exercise & weight monitoring are important for maintaining a healthy lifestyle    You may be retaining fluid if you have a history of heart failure or if you experience any of the following symptoms:  Weight gain of 3 pounds or more overnight or 5 pounds in a week, increased swelling in our hands or feet or shortness of breath while lying flat in bed. Please call your doctor as soon as you notice any of these symptoms; do not wait until your next office visit. Recognize signs and symptoms of STROKE:    F-face looks uneven    A-arms unable to move or move unevenly    S-speech slurred or non-existent    T-time-call 911 as soon as signs and symptoms begin-DO NOT go       Back to bed or wait to see if you get better-TIME IS BRAIN. Warning Signs of HEART ATTACK     Call 911 if you have these symptoms:   Chest discomfort. Most heart attacks involve discomfort in the center of the chest that lasts more than a few minutes, or that goes away and comes back. It can feel like uncomfortable pressure, squeezing, fullness, or pain.  Discomfort in other areas of the upper body. Symptoms can include pain or discomfort in one or both arms, the back, neck, jaw, or stomach.  Shortness of breath with or without chest discomfort.  Other signs may include breaking out in a cold sweat, nausea, or lightheadedness. Don't wait more than five minutes to call 911 - MINUTES MATTER! Fast action can save your life. Calling 911 is almost always the fastest way to get lifesaving treatment. Emergency Medical Services staff can begin treatment when they arrive -- up to an hour sooner than if someone gets to the hospital by car. The discharge information has been reviewed with the guardian. The guardian verbalized understanding. Discharge medications reviewed with the guardian and appropriate educational materials and side effects teaching were provided.

## 2017-03-16 ENCOUNTER — HOSPITAL ENCOUNTER (EMERGENCY)
Age: 72
Discharge: HOME | End: 2017-03-16
Attending: EMERGENCY MEDICINE
Payer: COMMERCIAL

## 2017-03-16 VITALS
SYSTOLIC BLOOD PRESSURE: 140 MMHG | BODY MASS INDEX: 27.2 KG/M2 | HEIGHT: 70 IN | DIASTOLIC BLOOD PRESSURE: 81 MMHG | WEIGHT: 190 LBS | TEMPERATURE: 97.8 F | RESPIRATION RATE: 20 BRPM | HEART RATE: 86 BPM | OXYGEN SATURATION: 96 %

## 2017-03-16 DIAGNOSIS — I69.369 PARALYTIC SYNDROME NONDOMINANT SIDE S/P CVA (CEREBROVASCULAR ACCIDENT) (HCC): Primary | ICD-10-CM

## 2017-03-16 LAB
ANION GAP BLD CALC-SCNC: 7 MMOL/L (ref 3–18)
APPEARANCE UR: CLEAR
BACTERIA URNS QL MICRO: NEGATIVE /HPF
BASOPHILS # BLD AUTO: 0.2 K/UL (ref 0–0.06)
BASOPHILS # BLD: 1 % (ref 0–2)
BILIRUB UR QL: NEGATIVE
BUN SERPL-MCNC: 15 MG/DL (ref 7–18)
BUN/CREAT SERPL: 12 (ref 12–20)
CALCIUM SERPL-MCNC: 9.4 MG/DL (ref 8.5–10.1)
CHLORIDE SERPL-SCNC: 101 MMOL/L (ref 100–108)
CO2 SERPL-SCNC: 31 MMOL/L (ref 21–32)
COLOR UR: YELLOW
CREAT SERPL-MCNC: 1.29 MG/DL (ref 0.6–1.3)
DIFFERENTIAL METHOD BLD: ABNORMAL
EOSINOPHIL # BLD: 0.5 K/UL (ref 0–0.4)
EOSINOPHIL NFR BLD: 3 % (ref 0–5)
EPITH CASTS URNS QL MICRO: NEGATIVE /LPF (ref 0–5)
ERYTHROCYTE [DISTWIDTH] IN BLOOD BY AUTOMATED COUNT: 13.7 % (ref 11.6–14.5)
GLUCOSE SERPL-MCNC: 171 MG/DL (ref 74–99)
GLUCOSE UR STRIP.AUTO-MCNC: NEGATIVE MG/DL
HCT VFR BLD AUTO: 50.8 % (ref 36–48)
HGB BLD-MCNC: 16.8 G/DL (ref 13–16)
HGB UR QL STRIP: ABNORMAL
KETONES UR QL STRIP.AUTO: NEGATIVE MG/DL
LEUKOCYTE ESTERASE UR QL STRIP.AUTO: NEGATIVE
LYMPHOCYTES # BLD AUTO: 19 % (ref 21–52)
LYMPHOCYTES # BLD: 2.7 K/UL (ref 0.9–3.6)
MCH RBC QN AUTO: 28.5 PG (ref 24–34)
MCHC RBC AUTO-ENTMCNC: 33.1 G/DL (ref 31–37)
MCV RBC AUTO: 86.2 FL (ref 74–97)
MONOCYTES # BLD: 1.3 K/UL (ref 0.05–1.2)
MONOCYTES NFR BLD AUTO: 9 % (ref 3–10)
NEUTS SEG # BLD: 9.6 K/UL (ref 1.8–8)
NEUTS SEG NFR BLD AUTO: 68 % (ref 40–73)
NITRITE UR QL STRIP.AUTO: NEGATIVE
PH UR STRIP: 5 [PH] (ref 5–8)
PLATELET # BLD AUTO: 357 K/UL (ref 135–420)
PMV BLD AUTO: 9.9 FL (ref 9.2–11.8)
POTASSIUM SERPL-SCNC: 4.1 MMOL/L (ref 3.5–5.5)
PROT UR STRIP-MCNC: NEGATIVE MG/DL
RBC # BLD AUTO: 5.89 M/UL (ref 4.7–5.5)
RBC #/AREA URNS HPF: ABNORMAL /HPF (ref 0–5)
SODIUM SERPL-SCNC: 139 MMOL/L (ref 136–145)
SP GR UR REFRACTOMETRY: 1.02 (ref 1–1.03)
TROPONIN I SERPL-MCNC: <0.02 NG/ML (ref 0–0.06)
URATE CRY URNS QL MICRO: ABNORMAL
UROBILINOGEN UR QL STRIP.AUTO: 1 EU/DL (ref 0.2–1)
WBC # BLD AUTO: 14.2 K/UL (ref 4.6–13.2)
WBC URNS QL MICRO: ABNORMAL /HPF (ref 0–4)

## 2017-03-16 PROCEDURE — 81001 URINALYSIS AUTO W/SCOPE: CPT | Performed by: EMERGENCY MEDICINE

## 2017-03-16 PROCEDURE — 96374 THER/PROPH/DIAG INJ IV PUSH: CPT

## 2017-03-16 PROCEDURE — 80048 BASIC METABOLIC PNL TOTAL CA: CPT | Performed by: EMERGENCY MEDICINE

## 2017-03-16 PROCEDURE — 85025 COMPLETE CBC W/AUTO DIFF WBC: CPT | Performed by: EMERGENCY MEDICINE

## 2017-03-16 PROCEDURE — 84484 ASSAY OF TROPONIN QUANT: CPT | Performed by: EMERGENCY MEDICINE

## 2017-03-16 PROCEDURE — 93005 ELECTROCARDIOGRAM TRACING: CPT

## 2017-03-16 PROCEDURE — 99283 EMERGENCY DEPT VISIT LOW MDM: CPT

## 2017-03-16 PROCEDURE — 74011250636 HC RX REV CODE- 250/636: Performed by: EMERGENCY MEDICINE

## 2017-03-16 RX ORDER — HYDROMORPHONE HYDROCHLORIDE 1 MG/ML
0.5 INJECTION, SOLUTION INTRAMUSCULAR; INTRAVENOUS; SUBCUTANEOUS ONCE
Status: COMPLETED | OUTPATIENT
Start: 2017-03-16 | End: 2017-03-16

## 2017-03-16 RX ADMIN — HYDROMORPHONE HYDROCHLORIDE 0.5 MG: 1 INJECTION, SOLUTION INTRAMUSCULAR; INTRAVENOUS; SUBCUTANEOUS at 17:50

## 2017-03-16 NOTE — DISCHARGE INSTRUCTIONS
Stroke: Care Instructions  Your Care Instructions    You have had a stroke. This means that the blood flow to a part of your brain was blocked for some time, which damages the nerve cells in that part of the brain. The part of your body controlled by that part of your brain may not function properly now. The brain is an amazing organ that can heal itself to some degree. The stroke you had damaged part of your brain. But other parts of your brain may take over in some way for the damaged areas. You have already started this process. Your doctor will talk with you about what you can do to prevent another stroke. High blood pressure, high cholesterol, and diabetes are all risk factors for stroke. If you have any of these conditions, work with your doctor to make sure they are under control. Other risk factors for stroke include being overweight, smoking, and not getting regular exercise. Going home may be hard for you and your family. The more you can try to do for yourself, the better. Remember to take each day one at a time. Follow-up care is a key part of your treatment and safety. Be sure to make and go to all appointments, and call your doctor if you are having problems. It's also a good idea to know your test results and keep a list of the medicines you take. How can you care for yourself at home? · Enter a stroke rehabilitation (rehab) program, if your doctor recommends it. Physical, speech, and occupational therapies can help you manage bathing, dressing, eating, and other basics of daily living. · Do not drive until your doctor says it is okay. · It is normal to feel sad or depressed after a stroke. If these feelings last, talk to your doctor. · If you are having problems with urine leakage, go to the bathroom at regular times, including when you first wake up and at bedtime. Also, limit fluids after dinner.   · If you are constipated, drink plenty of fluids, enough so that your urine is light yellow or clear like water. If you have kidney, heart, or liver disease and have to limit fluids, talk with your doctor before you increase the amount of fluids you drink. Set up a regular time for using the toilet. If you continue to have constipation, your doctor may suggest using a bulking agent, such as Metamucil, or a stool softener, laxative, or enema. Medicines  · Take your medicines exactly as prescribed. Call your doctor if you think you are having a problem with your medicine. You may be taking several medicines. ACE (angiotensin-converting enzyme) inhibitors, angiotensin II receptor blockers (ARBs), beta-blockers, diuretics (water pills), and calcium channel blockers control your blood pressure. Statins help lower cholesterol. Your doctor may also prescribe medicines for depression, pain, sleep problems, anxiety, or agitation. · If your doctor has given you a blood thinner to prevent another stroke, be sure you get instructions about how to take your medicine safely. Blood thinners can cause serious bleeding problems. · Do not take any over-the-counter medicines or herbal products without talking to your doctor first.  · If you take birth control pills or hormone therapy, talk to your doctor about whether they are right for you. For family members and caregivers  · Make the home safe. Set up a room so that your loved one does not have to climb stairs. Be sure the bathroom is on the same floor. Move throw rugs and furniture that could cause falls. Make sure that the lighting is good. Put grab bars and seats in tubs and showers. · Find out what your loved one can do and what he or she needs help with. Try not to do things for your loved one that your loved one can do on his or her own. Help him or her learn and practice new skills. · Visit and talk with your loved one often. Try doing activities together that you both enjoy, such as playing cards or board games.  Keep in touch with your loved one's friends as much as you can. Encourage them to visit. · Take care of yourself. Do not try to do everything yourself. Ask other family members to help. Eat well, get enough rest, and take time to do things that you enjoy. Keep up with your own doctor visits, and make sure to take your medicines regularly. Get out of the house as much as you can. Join a local support group. Find out if you qualify for home health care visits to help with rehab or for adult day care. When should you call for help? Call 911 anytime you think you may need emergency care. For example, call if:  · You have signs of another stroke. These may include:  ¨ Sudden numbness, tingling, weakness, or loss of movement in your face, arm, or leg, especially on only one side of your body. ¨ Sudden vision changes. ¨ Sudden trouble speaking. ¨ Sudden confusion or trouble understanding simple statements. ¨ Sudden problems with walking or balance. ¨ A sudden, severe headache that is different from past headaches. Call 911 even if these symptoms go away in a few minutes. Call your doctor now or seek immediate medical care if:  · You have new symptoms that may be related to your stroke, such as falls or trouble swallowing. Watch closely for changes in your health, and be sure to contact your doctor if you have any problems. Where can you learn more? Go to http://jorge-david.info/. Enter P461 in the search box to learn more about \"Stroke: Care Instructions. \"  Current as of: June 4, 2016  Content Version: 11.1  © 1286-4724 [employer]. Care instructions adapted under license by [employer] (which disclaims liability or warranty for this information). If you have questions about a medical condition or this instruction, always ask your healthcare professional. [address] any warranty or liability for your use of this information.

## 2017-03-16 NOTE — ROUTINE PROCESS
[facility]

## 2017-03-16 NOTE — ED PROVIDER NOTES
HPI Comments: 3:45 PM Madeline Braun is a 73 y.o. male with a history of TIA, DM, and HTN who presents to the ED via EMS c/o left arm and leg pain with movement. Pt was brought from the nursing home due to left sided weakness. Pt recently had a stroke and no longer has movement in left hand or foot. Pt is also c/o bilateral knee pain with movement. No SOB or CP. Pt is currently in no pain. No other concerns. PCP: Alcira Bonds MD          The history is provided by the patient. Past Medical History:   Diagnosis Date    Arthritis     CAD (coronary artery disease)     Diabetes ([facility] [address]     Hypertension     Stroke [facility])        Past Surgical History:   Procedure Laterality Date    HX ORTHOPAEDIC      HX OTHER SURGICAL      tonscillectomy          No family history on file. Social History     Social History    Marital status:      Spouse name: N/A    Number of children: N/A    Years of education: N/A     Occupational History    Not on file. Social History Main Topics    Smoking status: Former Smoker     Packs/day: 0.50    Smokeless tobacco: Not on file    Alcohol use Yes      Comment: patient use to drink beer    Drug use: No    Sexual activity: Not on file     Other Topics Concern    Not on file     Social History Narrative         ALLERGIES: Review of patient's allergies indicates no known allergies. Review of Systems   Constitutional: Negative for diaphoresis and fever. HENT: Negative for ear pain, rhinorrhea and trouble swallowing. Eyes: Negative for visual disturbance. Respiratory: Negative for cough and shortness of breath. Cardiovascular: Negative for chest pain and leg swelling. Gastrointestinal: Negative for abdominal pain, blood in stool, diarrhea, nausea and vomiting. Genitourinary: Negative for difficulty urinating, flank pain and hematuria. Musculoskeletal: Negative for back pain and neck pain.         (+) Left arm pain   (+) Bilateral knee pain Skin: Negative for rash. Neurological: Negative for dizziness, weakness, numbness and headaches. Hematological: Negative. Psychiatric/Behavioral: Negative. All other systems reviewed and are negative. Vitals:    03/16/17 1520   Pulse: 91   Resp: 19   Temp: 97.8 °F (36.6 °C)   Weight: 86.2 kg (190 lb)   Height: 5' 10\" (1.778 m)            Physical Exam   Constitutional: He is oriented to person, place, and time. He appears well-developed and well-nourished. HENT:   Head: Normocephalic and atraumatic. Mouth/Throat: Oropharynx is clear and moist.   Eyes: Conjunctivae are normal. Pupils are equal, round, and reactive to light. No scleral icterus. Neck: Normal range of motion. Neck supple. No JVD present. No tracheal deviation present. Cardiovascular: Normal rate, regular rhythm and normal heart sounds. Pulmonary/Chest: Effort normal and breath sounds normal. No respiratory distress. He has no wheezes. Abdominal: Soft. Bowel sounds are normal.   Musculoskeletal: Normal range of motion. Neurological: He is alert and oriented to person, place, and time. Gait normal. GCS eye subscore is 4. GCS verbal subscore is 5. GCS motor subscore is 6. Left hemiparesis due to recent stroke   Skin: Skin is warm and dry. Psychiatric: He has a normal mood and affect. Nursing note and vitals reviewed. MDM  Number of Diagnoses or Management Options  Diagnosis management comments: Pt's care and disposition discussed with his wife and niece, pt has a residual L hemiparesis from he recent CVA, they confirm that there is no change to his baseline neuro status today. Will D/C pt back to re-hab facility for continued care.   Padmini Lundy MD  5:32 PM         Amount and/or Complexity of Data Reviewed  Clinical lab tests: ordered and reviewed      ED Course       Procedures  Vitals:  Patient Vitals for the past 12 hrs:   Temp Pulse Resp BP   03/16/17 1547 - - - 150/77   03/16/17 1534 - 94 20 150/77 03/16/17 1520 97.8 °F (36.6 °C) 91 19 -     EKG interpretation:  Normal sinus rhythm, rate 94 BPM, no acute changes per Dr. Mery Lopez, CT or other radiology findings or impressions:  No orders to display       Progress Notes:     Disposition: DC    Diagnosis: No diagnosis found. Follow-up Information     None          Scribe Attestation:     Sylvie Curtis, scribing for and in the presence of Mick Juarez MD March 16, 2017     Signed by: Chapincito Newton, March 16, 2017 at 3:51 PM     Documented by: Teja Mckoen scribing for, and in the presence of, Mick Juarez MD 4:26 PM     Signed by: Chapincito Rhoades, 03/16/17 4:26 PM    Physician Attestation:   I personally performed the services described in this documentation, reviewed and edited the documentation which was dictated to the scribe in my presence, and it accurately records my words and actions.  Mick Juarez MD  March 16, 2017

## 2017-03-17 LAB
ATRIAL RATE: 94 BPM
CALCULATED P AXIS, ECG09: 88 DEGREES
CALCULATED R AXIS, ECG10: 43 DEGREES
CALCULATED T AXIS, ECG11: 25 DEGREES
DIAGNOSIS, 93000: NORMAL
P-R INTERVAL, ECG05: 182 MS
Q-T INTERVAL, ECG07: 364 MS
QRS DURATION, ECG06: 78 MS
QTC CALCULATION (BEZET), ECG08: 455 MS
VENTRICULAR RATE, ECG03: 94 BPM

## 2021-08-03 PROBLEM — I63.9 CVA (CEREBRAL VASCULAR ACCIDENT) (HCC): Status: RESOLVED | Noted: 2017-03-07 | Resolved: 2021-08-03
